# Patient Record
Sex: FEMALE | Race: WHITE | Employment: UNEMPLOYED | RURAL
[De-identification: names, ages, dates, MRNs, and addresses within clinical notes are randomized per-mention and may not be internally consistent; named-entity substitution may affect disease eponyms.]

---

## 2017-03-10 ENCOUNTER — OFFICE VISIT (OUTPATIENT)
Dept: PRIMARY CARE CLINIC | Age: 27
End: 2017-03-10
Payer: MEDICAID

## 2017-03-10 VITALS
TEMPERATURE: 99.1 F | WEIGHT: 137.8 LBS | RESPIRATION RATE: 16 BRPM | BODY MASS INDEX: 23.52 KG/M2 | DIASTOLIC BLOOD PRESSURE: 64 MMHG | OXYGEN SATURATION: 98 % | HEIGHT: 64 IN | HEART RATE: 78 BPM | SYSTOLIC BLOOD PRESSURE: 102 MMHG

## 2017-03-10 DIAGNOSIS — J02.9 THROAT INFECTION: Primary | ICD-10-CM

## 2017-03-10 DIAGNOSIS — J02.9 ACUTE PHARYNGITIS, UNSPECIFIED ETIOLOGY: ICD-10-CM

## 2017-03-10 LAB — S PYO AG THROAT QL: NORMAL

## 2017-03-10 PROCEDURE — 87880 STREP A ASSAY W/OPTIC: CPT | Performed by: NURSE PRACTITIONER

## 2017-03-10 PROCEDURE — 99213 OFFICE O/P EST LOW 20 MIN: CPT | Performed by: NURSE PRACTITIONER

## 2017-03-10 RX ORDER — AZITHROMYCIN 250 MG/1
TABLET, FILM COATED ORAL
Qty: 1 PACKET | Refills: 0 | Status: SHIPPED | OUTPATIENT
Start: 2017-03-10 | End: 2017-03-20

## 2017-03-10 RX ORDER — IBUPROFEN 600 MG/1
600 TABLET ORAL EVERY 6 HOURS PRN
COMMUNITY
End: 2019-07-22 | Stop reason: ALTCHOICE

## 2017-03-10 ASSESSMENT — ENCOUNTER SYMPTOMS
SORE THROAT: 1
RESPIRATORY NEGATIVE: 1
GASTROINTESTINAL NEGATIVE: 1
SINUS PRESSURE: 0
RHINORRHEA: 0

## 2017-11-17 RX ORDER — AZITHROMYCIN 500 MG/1
500 TABLET, FILM COATED ORAL DAILY
Qty: 5 TABLET | Refills: 0 | Status: SHIPPED | OUTPATIENT
Start: 2017-11-17 | End: 2017-11-22

## 2018-11-20 ENCOUNTER — INITIAL PRENATAL (OUTPATIENT)
Dept: OBSTETRICS AND GYNECOLOGY | Facility: CLINIC | Age: 28
End: 2018-11-20

## 2018-11-20 VITALS
WEIGHT: 129 LBS | DIASTOLIC BLOOD PRESSURE: 64 MMHG | BODY MASS INDEX: 22.02 KG/M2 | SYSTOLIC BLOOD PRESSURE: 124 MMHG | HEIGHT: 64 IN

## 2018-11-20 DIAGNOSIS — Z34.81 ENCOUNTER FOR SUPERVISION OF OTHER NORMAL PREGNANCY IN FIRST TRIMESTER: ICD-10-CM

## 2018-11-20 DIAGNOSIS — O36.80X0 ENCOUNTER TO DETERMINE FETAL VIABILITY OF PREGNANCY, SINGLE OR UNSPECIFIED FETUS: Primary | ICD-10-CM

## 2018-11-20 PROBLEM — Z87.442 HISTORY OF KIDNEY STONES: Status: ACTIVE | Noted: 2018-11-20

## 2018-11-20 PROCEDURE — 99204 OFFICE O/P NEW MOD 45 MIN: CPT | Performed by: MIDWIFE

## 2018-11-20 RX ORDER — PROMETHAZINE HYDROCHLORIDE 25 MG/1
25 TABLET ORAL EVERY 6 HOURS PRN
Qty: 20 TABLET | Refills: 0 | Status: SHIPPED | OUTPATIENT
Start: 2018-11-20 | End: 2020-01-09

## 2018-11-20 RX ORDER — PRENATAL VIT/IRON FUM/FOLIC AC 27MG-0.8MG
1 TABLET ORAL DAILY
COMMUNITY
End: 2023-02-10

## 2018-11-20 NOTE — PROGRESS NOTES
"Subjective     Chief Complaint   Patient presents with   • Initial Prenatal Visit     LMP sometime in September, last pap 2018 at Dr Gaona's, c/o diarrhea for the last 2 weeks        Ines Huntley is a 28 y.o. .  Patient's last menstrual period was 2018 (approximate)..  She presents to be seen to initiate prenatal care with our practice. She had a SAB with D+C 2018. # previous pregnacies before that were . She had kidney stones with all of her pregnancies and was referred to see Urologist. She spontaneously passed all stones. She doesn't have a problem with this when not pregnant.    She has had nausea and vomiting for 1-2 weeks. She is having difficulty keeping anything down on some days. She is trying to stay hydrated and eat small meals. She has also had diarrhea x 2 weeks. She started taking vitamin QOD to see if that helped but it hasn't yet.      The following portions of the patient's history were reviewed and updated as appropriate:vital signs, current medications, past medical history, past surgical history and problem list.  Allergies: NKDA  Social history: , nonsmoker    Review of Systems -   /64   Ht 162.6 cm (64\")   Wt 58.5 kg (129 lb)   LMP 2018 (Approximate)   BMI 22.14 kg/m²   Gastrointestinal: Nausea and vomiting, denies constipation   Genitourinary: Frequency - denies urgency or burning with urination  All other systems reviewed and are negative    Objective     Physical Exam  Constitutional   The patient is alert, well developed & well nourished.   Neck   The neck is supple and the trachea is midline. The thyroid is not enlarged and there are no palpable nodules.   Breast   Inspection of the breast reveals symmetrical. The nipples are everted. No masses palpated  Respiratory  The patient is relaxed and breathes without effort. Lungs CTAB  Cardiovascular  Regular rate and rhythm without murmur -  Negative LE pitting edema  Gastrointestinal   The " abdomen is soft and non tender. No hepatosplenomegaly  Genitourinary   - External Genitalia without erythema, lesions, or masses  -Vagina - There is no abnormal vaginal discharge.   -Cervix: Negative cervical motion tenderness   Uterus - uterine body size is approximate to dates, 6-8 week size  Adnexa structures are nontender and without masses  Perineum is without inflammation or lesion  Skin  Normal color. No rashes or lesions  Extremities  Full ROM. No rashes or edema  Psychiatric  The patient is oriented to person, place, and time. Speech is fluent and words are clear    Imaging   No data reviewed        Assessment/Plan     ASSESSMENT  1. IUP at 7w5d   2. Low risk pregnancy  3. First trimester discomforts of pregnancy, N/V/D    PLAN  1. Tests ordered today:  Orders Placed This Encounter   Procedures   • NuSwab VG+ - Swab, Vagina   • US Ob Transvaginal     Standing Status:   Future     Standing Expiration Date:   11/20/2019     Order Specific Question:   Reason for Exam:     Answer:   viability of pregnancy, hx SAB   • OB Panel With HIV     2. Medications prescribed today:  New Medications Ordered This Visit   Medications   • promethazine (PHENERGAN) 25 MG tablet     Sig: Take 1 tablet by mouth Every 6 (Six) Hours As Needed for Nausea or Vomiting.     Dispense:  20 tablet     Refill:  0     3. Information reviewed: exercise in pregnancy, nutrition in pregnancy, weight gain in pregnancy, work and travel restrictions during pregnancy, list of OTC medications acceptable in pregnancy and call coverage groups  4. Genetic testing reviewed: Cystic Fibrosis Screen  5. Ginger products, Vit B6 supp, Unisom, or seabands PRN      Follow up: 4 week(s)         This note was electronically signed.    Mattie Lee CNM  11/20/2018

## 2018-11-22 LAB
A VAGINAE DNA VAG QL NAA+PROBE: NORMAL SCORE
ABO GROUP BLD: (no result)
BASOPHILS # BLD AUTO: 0 X10E3/UL (ref 0–0.2)
BASOPHILS NFR BLD AUTO: 0 %
BLD GP AB SCN SERPL QL: NEGATIVE
BVAB2 DNA VAG QL NAA+PROBE: NORMAL SCORE
C ALBICANS DNA VAG QL NAA+PROBE: NEGATIVE
C GLABRATA DNA VAG QL NAA+PROBE: NEGATIVE
C TRACH RRNA SPEC QL NAA+PROBE: NEGATIVE
EOSINOPHIL # BLD AUTO: 0.1 X10E3/UL (ref 0–0.4)
EOSINOPHIL NFR BLD AUTO: 1 %
ERYTHROCYTE [DISTWIDTH] IN BLOOD BY AUTOMATED COUNT: 13.6 % (ref 12.3–15.4)
HBV SURFACE AG SERPL QL IA: NEGATIVE
HCT VFR BLD AUTO: 40.6 % (ref 34–46.6)
HCV AB S/CO SERPL IA: <0.1 S/CO RATIO (ref 0–0.9)
HGB BLD-MCNC: 13.8 G/DL (ref 11.1–15.9)
HIV 1+2 AB+HIV1 P24 AG SERPL QL IA: NON REACTIVE
IMM GRANULOCYTES # BLD: 0 X10E3/UL (ref 0–0.1)
IMM GRANULOCYTES NFR BLD: 0 %
LYMPHOCYTES # BLD AUTO: 0.9 X10E3/UL (ref 0.7–3.1)
LYMPHOCYTES NFR BLD AUTO: 14 %
MCH RBC QN AUTO: 30.2 PG (ref 26.6–33)
MCHC RBC AUTO-ENTMCNC: 34 G/DL (ref 31.5–35.7)
MCV RBC AUTO: 89 FL (ref 79–97)
MEGA1 DNA VAG QL NAA+PROBE: NORMAL SCORE
MONOCYTES # BLD AUTO: 0.7 X10E3/UL (ref 0.1–0.9)
MONOCYTES NFR BLD AUTO: 11 %
N GONORRHOEA RRNA SPEC QL NAA+PROBE: NEGATIVE
NEUTROPHILS # BLD AUTO: 4.7 X10E3/UL (ref 1.4–7)
NEUTROPHILS NFR BLD AUTO: 74 %
PLATELET # BLD AUTO: 202 X10E3/UL (ref 150–379)
RBC # BLD AUTO: 4.57 X10E6/UL (ref 3.77–5.28)
RH BLD: NEGATIVE
RPR SER QL: NON REACTIVE
RUBV IGG SERPL IA-ACNC: 1.14 INDEX
T VAGINALIS RRNA SPEC QL NAA+PROBE: NEGATIVE
WBC # BLD AUTO: 6.4 X10E3/UL (ref 3.4–10.8)

## 2018-11-29 PROBLEM — Z67.91 RH NEGATIVE STATUS DURING PREGNANCY: Status: ACTIVE | Noted: 2018-11-20

## 2018-11-29 PROBLEM — O26.899 RH NEGATIVE STATUS DURING PREGNANCY: Status: ACTIVE | Noted: 2018-11-20

## 2018-12-03 ENCOUNTER — ROUTINE PRENATAL (OUTPATIENT)
Dept: OBSTETRICS AND GYNECOLOGY | Facility: CLINIC | Age: 28
End: 2018-12-03

## 2018-12-03 VITALS — DIASTOLIC BLOOD PRESSURE: 62 MMHG | BODY MASS INDEX: 22.83 KG/M2 | WEIGHT: 133 LBS | SYSTOLIC BLOOD PRESSURE: 124 MMHG

## 2018-12-03 DIAGNOSIS — Z34.91 NORMAL PREGNANCY, FIRST TRIMESTER: Primary | ICD-10-CM

## 2018-12-03 PROCEDURE — 99213 OFFICE O/P EST LOW 20 MIN: CPT | Performed by: NURSE PRACTITIONER

## 2018-12-03 NOTE — PROGRESS NOTES
[id number]  Chief Complaint   Patient presents with   • Routine Prenatal Visit     scan for dates, c/o dizziness         HPI  , 9w1d reports dizzy spells occasionally -        ROS:  GI: Nausea - No; Constipation - No; Diarrhea - No    Neuro: Headache - No; Visual change - No        EXAM  General Appearance: pleasant  Lungs: Breathing unlabored  Abdomen:  See flow sheet for Fundal ht, FM, FHT's  LE: Neg edema    MDM  Impression:  Problems/Risk: Normal Pregnancy  Umbilical cord cyst  RH negative    Tests done today: none  TVS  (+ umbilical cord cyst) -    Topics discussed: encouraged small frequent healthy meals -   adeq rest and fluids  rev'd NOB labs   encouraged questions - call prn    Tests next visit: none     OB History      Para Term  AB Living    5 3 3   1 3    SAB TAB Ectopic Molar Multiple Live Births    1         3          Past Medical History:   Diagnosis Date   • Rh negative status during pregnancy 2018       Past Surgical History:   Procedure Laterality Date   • DILATATION AND CURETTAGE         History reviewed. No pertinent family history.    Social History     Socioeconomic History   • Marital status:      Spouse name: Not on file   • Number of children: Not on file   • Years of education: Not on file   • Highest education level: Not on file   Social Needs   • Financial resource strain: Not on file   • Food insecurity - worry: Not on file   • Food insecurity - inability: Not on file   • Transportation needs - medical: Not on file   • Transportation needs - non-medical: Not on file   Occupational History   • Not on file   Tobacco Use   • Smoking status: Never Smoker   • Smokeless tobacco: Never Used   Substance and Sexual Activity   • Alcohol use: No     Frequency: Never   • Drug use: No   • Sexual activity: Yes     Partners: Male     Birth control/protection: None   Other Topics Concern   • Not on file   Social History Narrative   • Not on file

## 2018-12-31 ENCOUNTER — APPOINTMENT (OUTPATIENT)
Dept: PREADMISSION TESTING | Facility: HOSPITAL | Age: 28
End: 2018-12-31

## 2018-12-31 ENCOUNTER — ROUTINE PRENATAL (OUTPATIENT)
Dept: OBSTETRICS AND GYNECOLOGY | Facility: CLINIC | Age: 28
End: 2018-12-31

## 2018-12-31 VITALS — DIASTOLIC BLOOD PRESSURE: 68 MMHG | WEIGHT: 137.4 LBS | BODY MASS INDEX: 23.58 KG/M2 | SYSTOLIC BLOOD PRESSURE: 114 MMHG

## 2018-12-31 VITALS — BODY MASS INDEX: 23.39 KG/M2 | HEIGHT: 64 IN | WEIGHT: 137 LBS

## 2018-12-31 DIAGNOSIS — O02.1 MISSED AB: ICD-10-CM

## 2018-12-31 DIAGNOSIS — O36.8390 ULTRASOUND SCAN DONE FOR INABILITY TO HEAR FETAL HEART TONES: ICD-10-CM

## 2018-12-31 DIAGNOSIS — O36.8390 ULTRASOUND SCAN DONE FOR INABILITY TO HEAR FETAL HEART TONES: Primary | ICD-10-CM

## 2018-12-31 LAB
BASOPHILS # BLD AUTO: 0.03 10*3/MM3 (ref 0–0.2)
BASOPHILS NFR BLD AUTO: 0.4 % (ref 0–2.5)
BILIRUB UR QL STRIP: NEGATIVE
CLARITY UR: ABNORMAL
COLOR UR: YELLOW
DEPRECATED RDW RBC AUTO: 41.8 FL (ref 37–54)
EOSINOPHIL # BLD AUTO: 0.03 10*3/MM3 (ref 0–0.7)
EOSINOPHIL NFR BLD AUTO: 0.4 % (ref 0–7)
ERYTHROCYTE [DISTWIDTH] IN BLOOD BY AUTOMATED COUNT: 12.9 % (ref 11.5–14.5)
GLUCOSE UR STRIP-MCNC: NEGATIVE MG/DL
HCT VFR BLD AUTO: 39.8 % (ref 37–47)
HGB BLD-MCNC: 13.6 G/DL (ref 12–16)
HGB UR QL STRIP.AUTO: NEGATIVE
IMM GRANULOCYTES # BLD AUTO: 0.06 10*3/MM3 (ref 0–0.06)
IMM GRANULOCYTES NFR BLD AUTO: 0.8 % (ref 0–0.6)
KETONES UR QL STRIP: NEGATIVE
LEUKOCYTE ESTERASE UR QL STRIP.AUTO: NEGATIVE
LYMPHOCYTES # BLD AUTO: 1.74 10*3/MM3 (ref 0.6–3.4)
LYMPHOCYTES NFR BLD AUTO: 24.2 % (ref 10–50)
MCH RBC QN AUTO: 30.4 PG (ref 27–31)
MCHC RBC AUTO-ENTMCNC: 34.2 G/DL (ref 30–37)
MCV RBC AUTO: 88.8 FL (ref 81–99)
MONOCYTES # BLD AUTO: 0.54 10*3/MM3 (ref 0–0.9)
MONOCYTES NFR BLD AUTO: 7.5 % (ref 0–12)
NEUTROPHILS # BLD AUTO: 4.8 10*3/MM3 (ref 2–6.9)
NEUTROPHILS NFR BLD AUTO: 66.7 % (ref 37–80)
NITRITE UR QL STRIP: NEGATIVE
NRBC BLD AUTO-RTO: 0 /100 WBC (ref 0–0)
PH UR STRIP.AUTO: 7 [PH] (ref 5–8)
PLATELET # BLD AUTO: 207 10*3/MM3 (ref 130–400)
PMV BLD AUTO: 10.6 FL (ref 6–12)
PROT UR QL STRIP: NEGATIVE
RBC # BLD AUTO: 4.48 10*6/MM3 (ref 4.2–5.4)
SP GR UR STRIP: 1.02 (ref 1–1.03)
UROBILINOGEN UR QL STRIP: ABNORMAL
WBC NRBC COR # BLD: 7.2 10*3/MM3 (ref 4.8–10.8)

## 2018-12-31 PROCEDURE — 85025 COMPLETE CBC W/AUTO DIFF WBC: CPT | Performed by: OBSTETRICS & GYNECOLOGY

## 2018-12-31 PROCEDURE — 81003 URINALYSIS AUTO W/O SCOPE: CPT | Performed by: OBSTETRICS & GYNECOLOGY

## 2018-12-31 PROCEDURE — 36415 COLL VENOUS BLD VENIPUNCTURE: CPT

## 2018-12-31 PROCEDURE — 99213 OFFICE O/P EST LOW 20 MIN: CPT | Performed by: OBSTETRICS & GYNECOLOGY

## 2018-12-31 RX ORDER — SODIUM CHLORIDE 0.9 % (FLUSH) 0.9 %
1-10 SYRINGE (ML) INJECTION AS NEEDED
Status: CANCELLED | OUTPATIENT
Start: 2018-12-31

## 2018-12-31 RX ORDER — SODIUM CHLORIDE 0.9 % (FLUSH) 0.9 %
3 SYRINGE (ML) INJECTION EVERY 12 HOURS SCHEDULED
Status: CANCELLED | OUTPATIENT
Start: 2018-12-31

## 2018-12-31 NOTE — PROGRESS NOTES
Chief Complaint   Patient presents with   • Routine Prenatal Visit     C/O of headaches        HPI:   , 13w1d gestation reports doing well    ROS:  See Prenatal Episode/Flowsheet  /68   Wt 62.3 kg (137 lb 6.4 oz)   LMP 2018 (Approximate)   BMI 23.58 kg/m²      EXAM:  EXTREMITIES:  No swelling-See Prenatal Episode/Flowsheet    ABDOMEN:  FHTs/Movement noted-See Prenatal Episode/Flowsheet    URINE GLUCOSE/PROTEIN:  See Prenatal Episode/Flowsheet    PELVIC EXAM:  See Prenatal Episode/Flowsheet  CV:  Lungs:    MDM:    Lab Results   Component Value Date    HGB 13.8 2018    RUBELLAABIGG 1.14 2018    HEPBSAG Negative 2018    ABO O 2018    RH Negative 2018    ABSCRN Negative 2018    NRG4BTC5 Non Reactive 2018    HEPCVIRUSABY <0.1 2018       U/S: CRL 11 weeks, no CA    1. IUP 13w1d  2. Routine care ,   3. MAB @ 11 weeks

## 2019-01-01 NOTE — H&P
Morgan  Ines Hutnley  : 1990  MRN: [id number]  CSN: [id number]    History and Physical    Subjective   Ines Huntley is a 28 y.o. year old  who present for surgery due to MAB @ 11 weeks.    Past Medical History:   Diagnosis Date   • Kidney stones    • Rh negative status during pregnancy 2018   • Wears contact lenses      Past Surgical History:   Procedure Laterality Date   • COLONOSCOPY     • DILATATION AND CURETTAGE       Social History    Tobacco Use      Smoking status: Never Smoker      Smokeless tobacco: Never Used    No current facility-administered medications for this encounter.     Current Outpatient Medications:   •  Prenatal Vit-Fe Fumarate-FA (PRENATAL VITAMIN 27-0.8) 27-0.8 MG tablet tablet, Take 1 tablet by mouth Daily., Disp: , Rfl:   •  promethazine (PHENERGAN) 25 MG tablet, Take 1 tablet by mouth Every 6 (Six) Hours As Needed for Nausea or Vomiting. (Patient not taking: Reported on 2018), Disp: 20 tablet, Rfl: 0    Allergies   Allergen Reactions   • Amoxicillin Hives       Review of Systems   Constitutional: Negative.    HENT: Negative.    Respiratory: Negative.    Cardiovascular: Negative.          Objective   LMP 2018 (Approximate)   General: well developed; well nourished  no acute distress   Heart: regular rate and rhythm, S1, S2 normal, no murmur, click, rub or gallop   Lungs: breathing is unlabored  clear to auscultation bilaterally   Abdomen: soft, non-tender; no masses  no umbilical or inguinal hernias are present  no hepato-splenomegaly   Pelvis:: Not performed.   Labs  Lab Results   Component Value Date     2018    HGB 13.6 2018    HCT 39.8 2018    WBC 7.20 2018        Assessment   1. MAB 11 weeks  2. RH -     Plan   1. Suction D&C   2. Risks, complications, benefits, and other alternatives discussed.    Aleksey Danielle MD  2019  8:47 AM

## 2019-01-02 ENCOUNTER — HOSPITAL ENCOUNTER (OUTPATIENT)
Facility: HOSPITAL | Age: 29
Setting detail: HOSPITAL OUTPATIENT SURGERY
Discharge: HOME | End: 2019-01-02
Attending: OBSTETRICS & GYNECOLOGY | Admitting: OBSTETRICS & GYNECOLOGY

## 2019-01-02 ENCOUNTER — ANESTHESIA (OUTPATIENT)
Dept: PERIOP | Facility: HOSPITAL | Age: 29
End: 2019-01-02

## 2019-01-02 ENCOUNTER — ANESTHESIA EVENT (OUTPATIENT)
Dept: PERIOP | Facility: HOSPITAL | Age: 29
End: 2019-01-02

## 2019-01-02 VITALS
HEART RATE: 75 BPM | DIASTOLIC BLOOD PRESSURE: 64 MMHG | TEMPERATURE: 97.9 F | RESPIRATION RATE: 16 BRPM | SYSTOLIC BLOOD PRESSURE: 105 MMHG | OXYGEN SATURATION: 100 %

## 2019-01-02 DIAGNOSIS — O02.1 MISSED AB: ICD-10-CM

## 2019-01-02 DIAGNOSIS — O36.8390 ULTRASOUND SCAN DONE FOR INABILITY TO HEAR FETAL HEART TONES: ICD-10-CM

## 2019-01-02 PROCEDURE — 25010000002 MIDAZOLAM PER 1 MG: Performed by: NURSE ANESTHETIST, CERTIFIED REGISTERED

## 2019-01-02 PROCEDURE — 25010000002 METHYLERGONOVINE MALEATE PER 0.2 MG: Performed by: NURSE ANESTHETIST, CERTIFIED REGISTERED

## 2019-01-02 PROCEDURE — 25010000002 PROPOFOL 10 MG/ML EMULSION: Performed by: NURSE ANESTHETIST, CERTIFIED REGISTERED

## 2019-01-02 PROCEDURE — 59820 CARE OF MISCARRIAGE: CPT | Performed by: OBSTETRICS & GYNECOLOGY

## 2019-01-02 PROCEDURE — 25010000002 KETOROLAC TROMETHAMINE PER 15 MG: Performed by: NURSE ANESTHETIST, CERTIFIED REGISTERED

## 2019-01-02 PROCEDURE — 25010000002 RHO D IMMUNE GLOBULIN 1500 UNIT/2ML SOLUTION PREFILLED SYRINGE: Performed by: OBSTETRICS & GYNECOLOGY

## 2019-01-02 PROCEDURE — 25010000002 FENTANYL CITRATE (PF) 100 MCG/2ML SOLUTION: Performed by: NURSE ANESTHETIST, CERTIFIED REGISTERED

## 2019-01-02 RX ORDER — HYDROCODONE BITARTRATE AND ACETAMINOPHEN 5; 325 MG/1; MG/1
1 TABLET ORAL ONCE AS NEEDED
Status: CANCELLED | OUTPATIENT
Start: 2019-01-02 | End: 2019-01-12

## 2019-01-02 RX ORDER — METHYLERGONOVINE MALEATE 0.2 MG/1
0.2 TABLET ORAL EVERY 6 HOURS
Qty: 4 TABLET | Refills: 0 | Status: SHIPPED | OUTPATIENT
Start: 2019-01-02 | End: 2020-01-09

## 2019-01-02 RX ORDER — METHYLERGONOVINE MALEATE 0.2 MG/ML
INJECTION INTRAVENOUS AS NEEDED
Status: DISCONTINUED | OUTPATIENT
Start: 2019-01-02 | End: 2019-01-02 | Stop reason: SURG

## 2019-01-02 RX ORDER — IBUPROFEN 600 MG/1
600 TABLET ORAL EVERY 6 HOURS PRN
Status: CANCELLED | OUTPATIENT
Start: 2019-01-02

## 2019-01-02 RX ORDER — AZITHROMYCIN 250 MG/1
TABLET, FILM COATED ORAL
Qty: 6 TABLET | Refills: 0 | Status: SHIPPED | OUTPATIENT
Start: 2019-01-02 | End: 2019-01-07

## 2019-01-02 RX ORDER — KETOROLAC TROMETHAMINE 30 MG/ML
INJECTION, SOLUTION INTRAMUSCULAR; INTRAVENOUS AS NEEDED
Status: DISCONTINUED | OUTPATIENT
Start: 2019-01-02 | End: 2019-01-02 | Stop reason: SURG

## 2019-01-02 RX ORDER — MAGNESIUM HYDROXIDE 1200 MG/15ML
LIQUID ORAL AS NEEDED
Status: DISCONTINUED | OUTPATIENT
Start: 2019-01-02 | End: 2019-01-02 | Stop reason: HOSPADM

## 2019-01-02 RX ORDER — ONDANSETRON 4 MG/1
4 TABLET, FILM COATED ORAL ONCE AS NEEDED
Status: DISCONTINUED | OUTPATIENT
Start: 2019-01-02 | End: 2019-01-02 | Stop reason: HOSPADM

## 2019-01-02 RX ORDER — PROPOFOL 10 MG/ML
VIAL (ML) INTRAVENOUS AS NEEDED
Status: DISCONTINUED | OUTPATIENT
Start: 2019-01-02 | End: 2019-01-02 | Stop reason: SURG

## 2019-01-02 RX ORDER — IBUPROFEN 600 MG/1
600 TABLET ORAL EVERY 6 HOURS PRN
Qty: 30 TABLET | Refills: 1 | Status: SHIPPED | OUTPATIENT
Start: 2019-01-02 | End: 2020-01-09

## 2019-01-02 RX ORDER — SODIUM CHLORIDE, SODIUM LACTATE, POTASSIUM CHLORIDE, CALCIUM CHLORIDE 600; 310; 30; 20 MG/100ML; MG/100ML; MG/100ML; MG/100ML
1000 INJECTION, SOLUTION INTRAVENOUS CONTINUOUS
Status: DISCONTINUED | OUTPATIENT
Start: 2019-01-02 | End: 2019-01-02 | Stop reason: HOSPADM

## 2019-01-02 RX ORDER — HYDROCODONE BITARTRATE AND ACETAMINOPHEN 5; 325 MG/1; MG/1
1 TABLET ORAL EVERY 6 HOURS PRN
Qty: 6 TABLET | Refills: 0 | Status: SHIPPED | OUTPATIENT
Start: 2019-01-02 | End: 2020-01-09

## 2019-01-02 RX ORDER — FENTANYL CITRATE 50 UG/ML
INJECTION, SOLUTION INTRAMUSCULAR; INTRAVENOUS AS NEEDED
Status: DISCONTINUED | OUTPATIENT
Start: 2019-01-02 | End: 2019-01-02 | Stop reason: SURG

## 2019-01-02 RX ORDER — LIDOCAINE HYDROCHLORIDE 10 MG/ML
INJECTION, SOLUTION EPIDURAL; INFILTRATION; INTRACAUDAL; PERINEURAL AS NEEDED
Status: DISCONTINUED | OUTPATIENT
Start: 2019-01-02 | End: 2019-01-02 | Stop reason: HOSPADM

## 2019-01-02 RX ORDER — MIDAZOLAM HYDROCHLORIDE 1 MG/ML
INJECTION INTRAMUSCULAR; INTRAVENOUS AS NEEDED
Status: DISCONTINUED | OUTPATIENT
Start: 2019-01-02 | End: 2019-01-02 | Stop reason: SURG

## 2019-01-02 RX ADMIN — METHYLERGONOVINE MALEATE 200 MCG: 0.2 INJECTION, SOLUTION INTRAMUSCULAR; INTRAVENOUS at 09:36

## 2019-01-02 RX ADMIN — PROPOFOL 50 MG: 10 INJECTION, EMULSION INTRAVENOUS at 09:21

## 2019-01-02 RX ADMIN — HUMAN RHO(D) IMMUNE GLOBULIN 1500 UNITS: 1500 SOLUTION INTRAMUSCULAR; INTRAVENOUS at 10:20

## 2019-01-02 RX ADMIN — SODIUM CHLORIDE, POTASSIUM CHLORIDE, SODIUM LACTATE AND CALCIUM CHLORIDE 1000 ML: 600; 310; 30; 20 INJECTION, SOLUTION INTRAVENOUS at 07:40

## 2019-01-02 RX ADMIN — MIDAZOLAM HYDROCHLORIDE 2 MG: 1 INJECTION, SOLUTION INTRAMUSCULAR; INTRAVENOUS at 09:14

## 2019-01-02 RX ADMIN — PROPOFOL 50 MG: 10 INJECTION, EMULSION INTRAVENOUS at 09:16

## 2019-01-02 RX ADMIN — FENTANYL CITRATE 100 MCG: 50 INJECTION, SOLUTION INTRAMUSCULAR; INTRAVENOUS at 09:14

## 2019-01-02 RX ADMIN — PROPOFOL 50 MG: 10 INJECTION, EMULSION INTRAVENOUS at 09:35

## 2019-01-02 RX ADMIN — KETOROLAC TROMETHAMINE 30 MG: 30 INJECTION, SOLUTION INTRAMUSCULAR at 09:36

## 2019-01-02 RX ADMIN — PROPOFOL 50 MG: 10 INJECTION, EMULSION INTRAVENOUS at 09:30

## 2019-01-02 RX ADMIN — LIDOCAINE HYDROCHLORIDE 80 MG: 20 INJECTION, SOLUTION INTRAVENOUS at 09:16

## 2019-01-02 RX ADMIN — PROPOFOL 50 MG: 10 INJECTION, EMULSION INTRAVENOUS at 09:26

## 2019-01-02 NOTE — ANESTHESIA PREPROCEDURE EVALUATION
Anesthesia Evaluation     Patient summary reviewed and Nursing notes reviewed   NPO Solid Status: > 8 hours  NPO Liquid Status: > 8 hours           Airway   Mallampati: II  TM distance: >3 FB  Neck ROM: full  No difficulty expected  Dental      Pulmonary    Cardiovascular   Exercise tolerance: good (4-7 METS)    Rhythm: regular  Rate: normal        Neuro/Psych  GI/Hepatic/Renal/Endo      Musculoskeletal     Abdominal    Substance History      OB/GYN    (-)  Pregnant        Other                        Anesthesia Plan    ASA 2     MAC     intravenous induction   Anesthetic plan, all risks, benefits, and alternatives have been provided, discussed and informed consent has been obtained with: patient.

## 2019-01-02 NOTE — OP NOTE
Eddie Huntley  : 1990  MRN: [id number]  CSN: [id number]  Date: 2019    Operative Report    DILATATION AND CURETTAGE WITH SUCTION      Pre-op Diagnosis:  Ultrasound scan done for inability to hear fetal heart tones [O76]  Missed ab [O02.1]   Post-op Diagnosis:  Post-Op Diagnosis Codes:     * Ultrasound scan done for inability to hear fetal heart tones [O76]     * Missed ab [O02.1]   Procedure: Procedure(s):  DILATATION AND CURETTAGE WITH SUCTION   Surgeon: Surgeon(s):  Aleksey Danielle MD   Assist: DESEAN Velasco   Anesthesia: Monitor Anesthesia Care   Estimated Blood Loss: 50 mls   ABx: none   Specimens:  POC's   Findings: POC's   Complications: none     Description of Procedure:  After the appropriate time out and after adequate dosing of her anesthesia, the patient had been prepped and draped in the usual sterile fashion.  The patient had been placed in the dorsal lithotomy position using Jaylan stirrups.  The bladder had been drained with a red rubber catheter per nursing.  A weighted speculum was placed in the vagina.  The anterior lip of the cervix was grasped with a single tooth tenaculum.  The cervix was injected at the 3 and 9 o'clock positions with 1% lidocaine without any complications.  The cervix was then progressively dilated with Javed dilators. #10 Suction curettage was then performed followed by sharp curettage with a good cry throughout, followed by further Suction.  The cervical tenaculum was removed and the cervix was noted to be hemostatic.  The patient tolerated the procedure well.  Patient received 0.2 mg of Methergine intramuscularly at the completion.  There were no complications.  All instrument and sponge counts were correct at the end of the procedure.  She was taken to postoperative recovery room in stable condition.      Aleksey Danielle MD   2019  9:42 AM

## 2019-01-02 NOTE — ANESTHESIA POSTPROCEDURE EVALUATION
Patient: Ines Huntley    Procedure Summary     Date:  01/02/19 Room / Location:  [facility] 2 / [facility]    Anesthesia Start:  0911 Anesthesia Stop:      Procedure:  DILATATION AND CURETTAGE WITH SUCTION (N/A Vagina) Diagnosis:       Ultrasound scan done for inability to hear fetal heart tones      Missed ab      (Ultrasound scan done for inability to hear fetal heart tones [O76])      (Missed ab [O02.1])    Surgeon:  Aleksey Danielle MD Provider:  Jasbir Lee CRNA    Anesthesia Type:  MAC ASA Status:  2          Anesthesia Type: MAC  Last vitals  BP   100/61   Temp   97   Pulse 81   Resp   17    SpO2   98     Post Anesthesia Care and Evaluation    Patient location during evaluation: PACU  Patient participation: complete - patient participated  Level of consciousness: awake and alert  Pain score: 0  Pain management: satisfactory to patient  Airway patency: patent  Anesthetic complications: No anesthetic complications  PONV Status: none  Cardiovascular status: acceptable and stable  Respiratory status: acceptable and nasal cannula  Hydration status: acceptable

## 2019-01-02 NOTE — DISCHARGE INSTRUCTIONS
Keep the affected extremity elevated above  level of the heart.  Use your ice pack as instructed, do not use continuously.  Use your crutches and or sling as directed  Follow your physicians instructions as previously directed.    No pushing, pulling, tugging,  heavy lifting, or strenuous activity.  No major decision making, driving, or drinking alcoholic beverages for 24 hours. ( due to the medications you have  received)  Always use good hand hygiene/washing techniques.  NO driving while taking pain medications.    To assist you in voiding:  Drink plenty of fluids  Listen to running water while attempting to void.    If you are unable to urinate and you have an uncomfortable urge to void or it has been   6 hours since you were discharged, return to the Emergency Room

## 2019-01-07 ENCOUNTER — OFFICE VISIT (OUTPATIENT)
Dept: OBSTETRICS AND GYNECOLOGY | Facility: CLINIC | Age: 29
End: 2019-01-07

## 2019-01-07 VITALS
BODY MASS INDEX: 22.2 KG/M2 | SYSTOLIC BLOOD PRESSURE: 112 MMHG | DIASTOLIC BLOOD PRESSURE: 62 MMHG | WEIGHT: 130 LBS | HEIGHT: 64 IN

## 2019-01-07 PROCEDURE — 99213 OFFICE O/P EST LOW 20 MIN: CPT | Performed by: OBSTETRICS & GYNECOLOGY

## 2019-01-07 NOTE — PROGRESS NOTES
Subjective   Chief Complaint   Patient presents with   • Post-op Follow-up     Pt c/o of depression,anxiety, since miscarriage last week,     Ines Huntley is a 28 y.o. year old .  Patient's last menstrual period was 2018 (approximate).  She presents to be seen because of blues following suction D&C last week, doing well fro ma post op [facility] otherwise. Denies Si HI. Home with youngest throughout the day nad [facility] [place] door.     OTHER COMPLAINTS:  Nothing else    The following portions of the patient's history were reviewed and updated as appropriate:  She  has a past medical history of Kidney stones, Rh negative status during pregnancy (2018), and Wears contact lenses.  She does not have any pertinent problems on file.  She  has a past surgical history that includes Dilation and curettage of uterus; Colonoscopy; and DILATATION AND CURETTAGE WITH SUCTION (N/A, 2019).  Her family history is not on file.  She  reports that  has never smoked. she has never used smokeless tobacco. She reports that she does not drink alcohol or use drugs.    Current Outpatient Medications on File Prior to Visit   Medication Sig   • azithromycin (ZITHROMAX Z-BERT) 250 MG tablet Take 2 tablets by mouth on Day 1,  followed by 1 tablet once daily on Days 2 through 5.   • HYDROcodone-acetaminophen (LORTAB) 5-325 MG per tablet Take 1 tablet by mouth Every 6 (Six) Hours As Needed for Severe Pain .   • ibuprofen (ADVIL,MOTRIN) 600 MG tablet Take 1 tablet by mouth Every 6 (Six) Hours As Needed for Moderate Pain .   • methylergonovine (METHERGINE) 0.2 MG tablet Take 1 tablet by mouth Every 6 (Six) Hours.   • Prenatal Vit-Fe Fumarate-FA (PRENATAL VITAMIN 27-0.8) 27-0.8 MG tablet tablet Take 1 tablet by mouth Daily.   • promethazine (PHENERGAN) 25 MG tablet Take 1 tablet by mouth Every 6 (Six) Hours As Needed for Nausea or Vomiting. (Patient not taking: Reported on 2018)     No current facility-administered  "medications on file prior to visit.      She is allergic to amoxicillin.    Social History    Tobacco Use      Smoking status: Never Smoker      Smokeless tobacco: Never Used    Review of Systems  Consitutional POS: nothing reported    NEG: anorexia or night sweats   Gastointestinal POS: nothing reported    NEG: bloating, change in bowel habits, melena or reflux symptoms   Genitourinary POS: nothing reported    NEG: dysuria or hematuria   Integument POS: nothing reported    NEG: moles that are changing in size, shape, color or rashes   Breast POS: nothing reported    NEG: persistent breast lump, skin dimpling or nipple discharge         Respiratory: negative  Cardiovascular: negative          Objective   /62   Ht 162.6 cm (64\")   Wt 59 kg (130 lb)   LMP 09/27/2018 (Approximate)   Breastfeeding? Yes   BMI 22.31 kg/m²     General:  well developed; well nourished  no acute distress   Skin:  Not performed.   Thyroid: not examined   Lungs:  breathing is unlabored   Heart:  Not performed.   Breasts:  Not performed.   Abdomen: soft, non-tender; no masses  no umbilical or inguinal hernias are present  no hepato-splenomegaly   Pelvis: Not performed.     Psychiatric: Alert and oriented ×3, mood and affect appropriate  HEENT: Atraumatic, normocephalic, normal scleral icterus  Extremities: 2+ pulses bilaterally, no edema      Lab Review   No data reviewed    Imaging   No data reviewed        Assessment   1. Depression s/p suction D&C, post partum hisotry of this this as well as after last D&C, tried on prozac and effexor last time and did not feels as though they worked     Plan   1. Start Zoloft 50mg po qam, f/u 4 weeks  2.     No orders of the defined types were placed in this encounter.         This note was electronically signed.      January 7, 2019      "

## 2019-01-18 LAB
LAB AP CASE REPORT: NORMAL
PATH REPORT.FINAL DX SPEC: NORMAL

## 2019-07-22 ENCOUNTER — OFFICE VISIT (OUTPATIENT)
Dept: PRIMARY CARE CLINIC | Age: 29
End: 2019-07-22

## 2019-07-22 VITALS
TEMPERATURE: 98.4 F | BODY MASS INDEX: 22.36 KG/M2 | SYSTOLIC BLOOD PRESSURE: 103 MMHG | HEART RATE: 77 BPM | WEIGHT: 131 LBS | HEIGHT: 64 IN | OXYGEN SATURATION: 99 % | DIASTOLIC BLOOD PRESSURE: 67 MMHG

## 2019-07-22 DIAGNOSIS — F32.A ANXIETY AND DEPRESSION: Primary | ICD-10-CM

## 2019-07-22 DIAGNOSIS — F41.9 ANXIETY AND DEPRESSION: Primary | ICD-10-CM

## 2019-07-22 PROCEDURE — 99213 OFFICE O/P EST LOW 20 MIN: CPT | Performed by: NURSE PRACTITIONER

## 2019-07-22 PROCEDURE — G0444 DEPRESSION SCREEN ANNUAL: HCPCS | Performed by: NURSE PRACTITIONER

## 2019-07-22 ASSESSMENT — PATIENT HEALTH QUESTIONNAIRE - PHQ9
SUM OF ALL RESPONSES TO PHQ QUESTIONS 1-9: 7
1. LITTLE INTEREST OR PLEASURE IN DOING THINGS: 1
SUM OF ALL RESPONSES TO PHQ9 QUESTIONS 1 & 2: 4
10. IF YOU CHECKED OFF ANY PROBLEMS, HOW DIFFICULT HAVE THESE PROBLEMS MADE IT FOR YOU TO DO YOUR WORK, TAKE CARE OF THINGS AT HOME, OR GET ALONG WITH OTHER PEOPLE: 0
SUM OF ALL RESPONSES TO PHQ QUESTIONS 1-9: 7
2. FEELING DOWN, DEPRESSED OR HOPELESS: 3
9. THOUGHTS THAT YOU WOULD BE BETTER OFF DEAD, OR OF HURTING YOURSELF: 0
7. TROUBLE CONCENTRATING ON THINGS, SUCH AS READING THE NEWSPAPER OR WATCHING TELEVISION: 0
5. POOR APPETITE OR OVEREATING: 0
6. FEELING BAD ABOUT YOURSELF - OR THAT YOU ARE A FAILURE OR HAVE LET YOURSELF OR YOUR FAMILY DOWN: 0
4. FEELING TIRED OR HAVING LITTLE ENERGY: 0
8. MOVING OR SPEAKING SO SLOWLY THAT OTHER PEOPLE COULD HAVE NOTICED. OR THE OPPOSITE, BEING SO FIGETY OR RESTLESS THAT YOU HAVE BEEN MOVING AROUND A LOT MORE THAN USUAL: 0
3. TROUBLE FALLING OR STAYING ASLEEP: 3

## 2019-07-30 ENCOUNTER — OFFICE VISIT (OUTPATIENT)
Dept: PRIMARY CARE CLINIC | Age: 29
End: 2019-07-30

## 2019-07-30 VITALS
WEIGHT: 130 LBS | HEART RATE: 74 BPM | BODY MASS INDEX: 22.2 KG/M2 | SYSTOLIC BLOOD PRESSURE: 110 MMHG | DIASTOLIC BLOOD PRESSURE: 70 MMHG | RESPIRATION RATE: 16 BRPM | HEIGHT: 64 IN | OXYGEN SATURATION: 99 %

## 2019-07-30 DIAGNOSIS — J02.9 SORE THROAT: Primary | ICD-10-CM

## 2019-07-30 DIAGNOSIS — J02.8 PHARYNGITIS DUE TO OTHER ORGANISM: ICD-10-CM

## 2019-07-30 LAB — S PYO AG THROAT QL: NORMAL

## 2019-07-30 PROCEDURE — 87880 STREP A ASSAY W/OPTIC: CPT | Performed by: PEDIATRICS

## 2019-07-30 PROCEDURE — 99202 OFFICE O/P NEW SF 15 MIN: CPT | Performed by: PEDIATRICS

## 2019-07-30 RX ORDER — AZITHROMYCIN 250 MG/1
TABLET, FILM COATED ORAL
Qty: 6 TABLET | Refills: 0 | Status: SHIPPED | OUTPATIENT
Start: 2019-07-30 | End: 2020-03-18 | Stop reason: ALTCHOICE

## 2019-07-30 ASSESSMENT — ENCOUNTER SYMPTOMS
COUGH: 0
BACK PAIN: 0
WHEEZING: 0
SORE THROAT: 1
COUGH: 0
EYE DISCHARGE: 0
ABDOMINAL PAIN: 0
ABDOMINAL PAIN: 0
SHORTNESS OF BREATH: 0
SINUS PRESSURE: 0
NAUSEA: 0
ABDOMINAL DISTENTION: 0
VOMITING: 0
VOMITING: 0
SHORTNESS OF BREATH: 0
SINUS PAIN: 0
BACK PAIN: 0
NAUSEA: 0
CHEST TIGHTNESS: 0

## 2019-07-30 NOTE — PATIENT INSTRUCTIONS
· Keep a list of your medicines with you. List all of the prescription medicines, nonprescription medicines, supplements, natural remedies, and vitamins that you take. Tell your healthcare providers who treat you about all of the products you are taking. Your provider can provide you with a form to keep track of them. Just ask. · Follow the directions that come with your medicine, including information about food or alcohol. Make sure you know how and when to take your medicine. Do not take more or less than you are supposed to take. · Keep all medicines out of the reach of children. · Store medicines according to the directions on the label. · Monitor yourself. Learn to know how your body reacts to your new medicine and keep track of how it makes you feel before attempting (If your provider has allowed you to do so) to drive or go to work. · Seek emergency medical attention if you think you have used too much of this medicine. An overdose of any prescription medicine can be fatal. Overdose symptoms may include extreme drowsiness, muscle weakness, confusion, cold and clammy skin, pinpoint pupils, shallow breathing, slow heart rate, fainting, or coma. · Don't share prescription medicines with others, even when they seem to have the same symptoms. What may be good for you may be harmful to others. · If you are no longer taking a prescribed medication and you have pills left please take your pills out of their original containers. Mix crushed pills with an undesirable substance, such as cat litter or used coffee grounds. Put the mixture into a disposable container with a lid, such as an empty margarine tub, or into a sealable bag. Cover up or remove any of your personal information on the empty containers by covering it with black permanent marker or duct tape. Place the sealed container with the mixture, and the empty drug containers, in the trash.    · If you use a medication that is in the form of a patch, dispose of used patches by folding them in half so that the sticky sides meet, and then flushing them down a toilet. They should not be placed in the household trash where children or pets can find them. · If you have any questions, ask your provider or pharmacist for more information. · Be sure to keep all appointments for provider visits or tests. We are committed to providing you with the best care possible. In order to help us achieve these goals please remember to bring all medications, herbal products, and over the counter supplements with you to each visit. If your provider has ordered testing for you, please be sure to follow up with our office if you have not received results within 7 days after the testing took place. *If you receive a survey after visiting one of our offices, please take time to share your experience concerning your physician office visit. These surveys are confidential and no health information about you is shared. We are eager to improve for you and we are counting on your feedback to help make that happen. Thank you for requesting your Continuity of Care Document (CCD) electronically. Please follow the instructions below to securely access your online medical record. [employer] allows you to send messages to your doctor, view your test results, renew your prescriptions, schedule appointments, and more. How Do I Access my CCD? In your Internet browser, go to [email or web address] Enter your user name and password   Click on My medical Record  --> Download Summary --> Enter Password --> Download --> Save or Open Document    Additional Information  If you have questions, please contact your physician practice where you receive care. Remember, [employer] is NOT to be used for urgent needs. For medical emergencies, dial 911.

## 2019-12-23 ENCOUNTER — APPOINTMENT (OUTPATIENT)
Dept: ULTRASOUND IMAGING | Facility: HOSPITAL | Age: 29
End: 2019-12-23

## 2019-12-23 ENCOUNTER — HOSPITAL ENCOUNTER (EMERGENCY)
Facility: HOSPITAL | Age: 29
Discharge: HOME | End: 2019-12-23
Attending: EMERGENCY MEDICINE | Admitting: EMERGENCY MEDICINE

## 2019-12-23 VITALS
HEART RATE: 92 BPM | OXYGEN SATURATION: 100 % | SYSTOLIC BLOOD PRESSURE: 100 MMHG | RESPIRATION RATE: 18 BRPM | WEIGHT: 138.4 LBS | BODY MASS INDEX: 23.63 KG/M2 | DIASTOLIC BLOOD PRESSURE: 63 MMHG | HEIGHT: 64 IN | TEMPERATURE: 98.8 F

## 2019-12-23 DIAGNOSIS — R10.9 ABDOMINAL PAIN DURING PREGNANCY IN FIRST TRIMESTER: Primary | ICD-10-CM

## 2019-12-23 DIAGNOSIS — O26.891 ABDOMINAL PAIN DURING PREGNANCY IN FIRST TRIMESTER: Primary | ICD-10-CM

## 2019-12-23 LAB
ALBUMIN SERPL-MCNC: 4.1 G/DL (ref 3.5–5.2)
ALBUMIN/GLOB SERPL: 1.9 G/DL
ALP SERPL-CCNC: 53 U/L (ref 39–117)
ALT SERPL W P-5'-P-CCNC: 10 U/L (ref 1–33)
ANION GAP SERPL CALCULATED.3IONS-SCNC: 14.2 MMOL/L (ref 5–15)
AST SERPL-CCNC: 15 U/L (ref 1–32)
B-HCG UR QL: POSITIVE
BACTERIA UR QL AUTO: ABNORMAL /HPF
BASOPHILS # BLD AUTO: 0.05 10*3/MM3 (ref 0–0.2)
BASOPHILS NFR BLD AUTO: 0.6 % (ref 0–1.5)
BILIRUB SERPL-MCNC: 0.5 MG/DL (ref 0.2–1.2)
BILIRUB UR QL STRIP: NEGATIVE
BUN BLD-MCNC: 8 MG/DL (ref 6–20)
BUN/CREAT SERPL: 11.8 (ref 7–25)
CALCIUM SPEC-SCNC: 8.3 MG/DL (ref 8.6–10.5)
CHLORIDE SERPL-SCNC: 105 MMOL/L (ref 98–107)
CLARITY UR: CLEAR
CO2 SERPL-SCNC: 20.8 MMOL/L (ref 22–29)
COLOR UR: YELLOW
CREAT BLD-MCNC: 0.68 MG/DL (ref 0.57–1)
DEPRECATED RDW RBC AUTO: 41.8 FL (ref 37–54)
EOSINOPHIL # BLD AUTO: 0.04 10*3/MM3 (ref 0–0.4)
EOSINOPHIL NFR BLD AUTO: 0.5 % (ref 0.3–6.2)
ERYTHROCYTE [DISTWIDTH] IN BLOOD BY AUTOMATED COUNT: 12.7 % (ref 12.3–15.4)
GFR SERPL CREATININE-BSD FRML MDRD: 101 ML/MIN/1.73
GLOBULIN UR ELPH-MCNC: 2.2 GM/DL
GLUCOSE BLD-MCNC: 87 MG/DL (ref 65–99)
GLUCOSE UR STRIP-MCNC: NEGATIVE MG/DL
HCG INTACT+B SERPL-ACNC: NORMAL MIU/ML
HCT VFR BLD AUTO: 39.6 % (ref 34–46.6)
HGB BLD-MCNC: 13.5 G/DL (ref 12–15.9)
HGB UR QL STRIP.AUTO: NEGATIVE
HYALINE CASTS UR QL AUTO: ABNORMAL /LPF
IMM GRANULOCYTES # BLD AUTO: 0.06 10*3/MM3 (ref 0–0.05)
IMM GRANULOCYTES NFR BLD AUTO: 0.7 % (ref 0–0.5)
KETONES UR QL STRIP: NEGATIVE
LEUKOCYTE ESTERASE UR QL STRIP.AUTO: ABNORMAL
LYMPHOCYTES # BLD AUTO: 1.82 10*3/MM3 (ref 0.7–3.1)
LYMPHOCYTES NFR BLD AUTO: 21.7 % (ref 19.6–45.3)
MCH RBC QN AUTO: 30.9 PG (ref 26.6–33)
MCHC RBC AUTO-ENTMCNC: 34.1 G/DL (ref 31.5–35.7)
MCV RBC AUTO: 90.6 FL (ref 79–97)
MONOCYTES # BLD AUTO: 0.67 10*3/MM3 (ref 0.1–0.9)
MONOCYTES NFR BLD AUTO: 8 % (ref 5–12)
NEUTROPHILS # BLD AUTO: 5.76 10*3/MM3 (ref 1.7–7)
NEUTROPHILS NFR BLD AUTO: 68.5 % (ref 42.7–76)
NITRITE UR QL STRIP: NEGATIVE
NRBC BLD AUTO-RTO: 0 /100 WBC (ref 0–0.2)
PH UR STRIP.AUTO: 7 [PH] (ref 5–8)
PLATELET # BLD AUTO: 198 10*3/MM3 (ref 140–450)
PMV BLD AUTO: 10.4 FL (ref 6–12)
POTASSIUM BLD-SCNC: 3.9 MMOL/L (ref 3.5–5.2)
PROT SERPL-MCNC: 6.3 G/DL (ref 6–8.5)
PROT UR QL STRIP: NEGATIVE
RBC # BLD AUTO: 4.37 10*6/MM3 (ref 3.77–5.28)
RBC # UR: ABNORMAL /HPF
REF LAB TEST METHOD: ABNORMAL
SODIUM BLD-SCNC: 140 MMOL/L (ref 136–145)
SP GR UR STRIP: 1.01 (ref 1–1.03)
SQUAMOUS #/AREA URNS HPF: ABNORMAL /HPF
UROBILINOGEN UR QL STRIP: ABNORMAL
WBC NRBC COR # BLD: 8.4 10*3/MM3 (ref 3.4–10.8)
WBC UR QL AUTO: ABNORMAL /HPF

## 2019-12-23 PROCEDURE — 99283 EMERGENCY DEPT VISIT LOW MDM: CPT

## 2019-12-23 PROCEDURE — 80053 COMPREHEN METABOLIC PANEL: CPT | Performed by: EMERGENCY MEDICINE

## 2019-12-23 PROCEDURE — 96360 HYDRATION IV INFUSION INIT: CPT

## 2019-12-23 PROCEDURE — 84702 CHORIONIC GONADOTROPIN TEST: CPT | Performed by: EMERGENCY MEDICINE

## 2019-12-23 PROCEDURE — 85025 COMPLETE CBC W/AUTO DIFF WBC: CPT | Performed by: EMERGENCY MEDICINE

## 2019-12-23 PROCEDURE — 99284 EMERGENCY DEPT VISIT MOD MDM: CPT

## 2019-12-23 PROCEDURE — 81001 URINALYSIS AUTO W/SCOPE: CPT | Performed by: EMERGENCY MEDICINE

## 2019-12-23 PROCEDURE — 76817 TRANSVAGINAL US OBSTETRIC: CPT

## 2019-12-23 PROCEDURE — 81025 URINE PREGNANCY TEST: CPT | Performed by: EMERGENCY MEDICINE

## 2019-12-23 RX ORDER — SODIUM CHLORIDE 0.9 % (FLUSH) 0.9 %
10 SYRINGE (ML) INJECTION AS NEEDED
Status: DISCONTINUED | OUTPATIENT
Start: 2019-12-23 | End: 2019-12-23 | Stop reason: HOSPADM

## 2019-12-23 RX ADMIN — SODIUM CHLORIDE 1000 ML: 9 INJECTION, SOLUTION INTRAVENOUS at 13:36

## 2020-01-09 ENCOUNTER — INITIAL PRENATAL (OUTPATIENT)
Dept: OBSTETRICS AND GYNECOLOGY | Facility: CLINIC | Age: 30
End: 2020-01-09

## 2020-01-09 VITALS — SYSTOLIC BLOOD PRESSURE: 124 MMHG | BODY MASS INDEX: 24.03 KG/M2 | WEIGHT: 140 LBS | DIASTOLIC BLOOD PRESSURE: 68 MMHG

## 2020-01-09 DIAGNOSIS — Z34.81 ENCOUNTER FOR SUPERVISION OF OTHER NORMAL PREGNANCY IN FIRST TRIMESTER: Primary | ICD-10-CM

## 2020-01-09 DIAGNOSIS — O36.80X0 ENCOUNTER TO DETERMINE FETAL VIABILITY OF PREGNANCY, SINGLE OR UNSPECIFIED FETUS: ICD-10-CM

## 2020-01-09 LAB
C TRACH RRNA SPEC DONR QL NAA+PROBE: NEGATIVE
N GONORRHOEA DNA SPEC QL NAA+PROBE: NEGATIVE

## 2020-01-09 PROCEDURE — 99213 OFFICE O/P EST LOW 20 MIN: CPT | Performed by: MIDWIFE

## 2020-01-09 NOTE — PROGRESS NOTES
Subjective     Chief Complaint   Patient presents with   • Initial Prenatal Visit     LMP 11/10/19, seen in ED on 19 for cramping, had TVS in ED with IUP measuring 6w1d, pap due, states still having cramping but no vaginal bleeding        Ines Huntley is a 29 y.o. .  Patient's last menstrual period was 11/10/2019 (approximate)..  She presents to be seen to initiate prenatal care with our practice. Went to ED on  and had a TVS. She continues to have cramping and some spotting. Her pregnancy symptoms have all gone away. She is concerned due to her history of SABs.    Past Medical History:   Diagnosis Date   • Kidney stones    • Rh negative status during pregnancy 2018   • Wears contact lenses      Allergies   Allergen Reactions   • Amoxicillin Hives     Social History     Socioeconomic History   • Marital status:      Spouse name: Not on file   • Number of children: Not on file   • Years of education: Not on file   • Highest education level: Not on file   Tobacco Use   • Smoking status: Never Smoker   • Smokeless tobacco: Never Used   Substance and Sexual Activity   • Alcohol use: No     Frequency: Never   • Drug use: No   • Sexual activity: Yes     Partners: Male     Birth control/protection: None     History reviewed. No pertinent family history.      The following portions of the patient's history were reviewed and updated as appropriate:vital signs, allergies, current medications, past family history, past medical history, past social history, past surgical history and problem list.    Review of Systems -   /68   Wt 63.5 kg (140 lb)   LMP 11/10/2019 (Approximate)   BMI 24.03 kg/m²   Gastrointestinal: denies nausea and vomiting, denies constipation   Genitourinary: Frequency - denies urgency or burning with urination  All other systems reviewed and are negative    Objective     Physical Exam  Constitutional   The patient is alert, well developed & well nourished.   Neck    The neck is supple and the trachea is midline. The thyroid is not enlarged and there are no palpable nodules.   Breast   Inspection of the breast reveals symmetrical. The nipples are everted. No masses palpated  Respiratory  The patient is relaxed and breathes without effort. Lungs CTAB  Cardiovascular  Regular rate and rhythm without murmur -  Negative LE pitting edema  Gastrointestinal   The abdomen is soft and non tender. No hepatosplenomegaly  Genitourinary   - External Genitalia without erythema, lesions, or masses  -Vagina - There is no abnormal vaginal discharge.   -Cervix without discharge  Negative cervical motion tenderness   Uterus - uterine body size is 6-8 weeks  Adnexa structures are without masses  Perineum is without inflammation or lesion  Skin  Normal color. No rashes or lesions  Extremities  Full ROM. No rashes or edema  Psychiatric  The patient is oriented to person, place, and time. Speech is fluent and words are clear    Imaging   Pelvic ultrasound report  8w1d, + FHT      Assessment/Plan     ASSESSMENT  1. IUP at 8w4d   2. Low risk pregnancy  3.   Hx SAB    PLAN  1. Tests ordered today:  Orders Placed This Encounter   Procedures   • US Ob Transvaginal     Order Specific Question:   Reason for Exam:     Answer:   viability of pregnancy   • OB Panel With HIV     2. Medications prescribed today:  No orders of the defined types were placed in this encounter.    3. Information reviewed: exercise in pregnancy, nutrition in pregnancy, weight gain in pregnancy, work and travel restrictions during pregnancy, list of OTC medications acceptable in pregnancy and call coverage groups  4. Genetic testing reviewed: Cystic Fibrosis Screen  5. Pema products, Vit B6 supp, Unisom, or seabands PRN      Follow up: 4 week(s)         This note was electronically signed.    Mattie Lee CNM  1/9/2020

## 2020-01-10 LAB
ABO GROUP BLD: NORMAL
BASOPHILS # BLD AUTO: 0 X10E3/UL (ref 0–0.2)
BASOPHILS NFR BLD AUTO: 0 %
BLD GP AB SCN SERPL QL: NEGATIVE
EOSINOPHIL # BLD AUTO: 0 X10E3/UL (ref 0–0.4)
EOSINOPHIL NFR BLD AUTO: 0 %
ERYTHROCYTE [DISTWIDTH] IN BLOOD BY AUTOMATED COUNT: 13.3 % (ref 11.7–15.4)
HBV SURFACE AG SERPL QL IA: NEGATIVE
HCT VFR BLD AUTO: 38.2 % (ref 34–46.6)
HCV AB S/CO SERPL IA: <0.1 S/CO RATIO (ref 0–0.9)
HGB BLD-MCNC: 13 G/DL (ref 11.1–15.9)
HIV 1+2 AB+HIV1 P24 AG SERPL QL IA: NON REACTIVE
IMM GRANULOCYTES # BLD AUTO: 0 X10E3/UL (ref 0–0.1)
IMM GRANULOCYTES NFR BLD AUTO: 1 %
LYMPHOCYTES # BLD AUTO: 1.9 X10E3/UL (ref 0.7–3.1)
LYMPHOCYTES NFR BLD AUTO: 24 %
MCH RBC QN AUTO: 30 PG (ref 26.6–33)
MCHC RBC AUTO-ENTMCNC: 34 G/DL (ref 31.5–35.7)
MCV RBC AUTO: 88 FL (ref 79–97)
MONOCYTES # BLD AUTO: 0.6 X10E3/UL (ref 0.1–0.9)
MONOCYTES NFR BLD AUTO: 8 %
NEUTROPHILS # BLD AUTO: 5.5 X10E3/UL (ref 1.4–7)
NEUTROPHILS NFR BLD AUTO: 67 %
PLATELET # BLD AUTO: 235 X10E3/UL (ref 150–450)
RBC # BLD AUTO: 4.34 X10E6/UL (ref 3.77–5.28)
RH BLD: NEGATIVE
RPR SER QL: NON REACTIVE
RUBV IGG SERPL IA-ACNC: 1.3 INDEX
WBC # BLD AUTO: 8.2 X10E3/UL (ref 3.4–10.8)

## 2020-01-15 DIAGNOSIS — Z34.81 ENCOUNTER FOR SUPERVISION OF OTHER NORMAL PREGNANCY IN FIRST TRIMESTER: ICD-10-CM

## 2020-02-10 ENCOUNTER — ROUTINE PRENATAL (OUTPATIENT)
Dept: OBSTETRICS AND GYNECOLOGY | Facility: CLINIC | Age: 30
End: 2020-02-10

## 2020-02-10 VITALS — SYSTOLIC BLOOD PRESSURE: 118 MMHG | DIASTOLIC BLOOD PRESSURE: 62 MMHG | BODY MASS INDEX: 24.72 KG/M2 | WEIGHT: 144 LBS

## 2020-02-10 DIAGNOSIS — O09.299 HISTORY OF MISCARRIAGE, CURRENTLY PREGNANT: ICD-10-CM

## 2020-02-10 DIAGNOSIS — Z86.59 HISTORY OF POSTPARTUM DEPRESSION: ICD-10-CM

## 2020-02-10 DIAGNOSIS — O09.91 ENCOUNTER FOR SUPERVISION OF HIGH RISK PREGNANCY IN FIRST TRIMESTER, ANTEPARTUM: Primary | ICD-10-CM

## 2020-02-10 DIAGNOSIS — Z87.59 HISTORY OF POSTPARTUM DEPRESSION: ICD-10-CM

## 2020-02-10 PROCEDURE — 99213 OFFICE O/P EST LOW 20 MIN: CPT | Performed by: OBSTETRICS & GYNECOLOGY

## 2020-02-10 NOTE — PROGRESS NOTES
Chief Complaint   Patient presents with   • Routine Prenatal Visit     No complaints.        HPI: Ines is a  currently at 13w1d who today reports the following:  Nausea - No; Vaginal bleeding -  No; Heartburn - No.  The patient is without complaints.  Patient had labs last visit.  Patient denies any vaginal bleeding or spotting.  The patient did have 3 previous successful pregnancies.  Patient then had 2 miscarriages.  Patient's last miscarriage was 1 year ago.  Patient does have a history of postpartum depression.  Patient is currently not on any medications.  She is taking her prenatal vitamins.  ROS:   GI:   Nausea - No; Constipation - No; Diarrhea - No.   Neuro:  Headache - No; Visual disturbances - No.    EXAM:   Vitals:  See prenatal flowsheet as noted and reviewed   General: Alert, cooperative, and does not appear in any distress   Lungs:  Respirations regular, even and unlabored   Abdomen:   See prenatal flowsheet as noted and reviewed     Uterus gravid, non-tender; no palpable masses     No guarding or rebound tenderness   Pelvic:  See prenatal flowsheet as noted and reviewed   Ext:  See prenatal flowsheet as noted and reviewed     Moves extremities well, no cyanosis and no redness   Skin:  No bleeding, bruising or rash and no lesions noted   Psych:  Normal mood and affect, oriented and thought content appropriate   Urine:  See prenatal flowsheet as noted and reviewed    Prenatal Labs  Lab Results   Component Value Date    HGB 13.0 2020    RUBELLAABIGG 1.30 2020    HEPBSAG Negative 2020    ABO O 2020    RH Negative 2020    ABSCRN Negative 2020    [id number] Non Reactive 2020    HEPCVIRUSABY <0.1 2020       MDM:  Impression: Supervision of high risk pregnancy  history of miscarriage   History of pp depression   Tests done today:  I reviewed with the patient her labs from last visit as well as cultures.   Topics discussed: ab precautions  genetic  screening - Today we discussed genetic testing.  She is aware that the MSAFP-4 and NIPT - [id number] is a screening test.  A screening test is not a diagnostic test.  This means that a negative test does not guarantee an unaffected fetus and a positive test does not mean the fetus has the condition for which the test is being performed.  If the test returns positive, a diagnostic test should be consider to determine if the fetus in fact has the condition.  After considering the options previously presented, she is not interested in having genetic testing performed.   Tests next visit: U/S for anatomic screening   Next visit: See prenatal flowsheet     This note was electronically signed.  Zuleika Sinclair M.D.

## 2020-03-11 ENCOUNTER — TELEPHONE (OUTPATIENT)
Dept: PRIMARY CARE CLINIC | Age: 30
End: 2020-03-11

## 2020-03-11 NOTE — TELEPHONE ENCOUNTER
Patient called and is requesting to change her PCP to Dacia Hagan; She is currently established with Dr Stevan Reed. Please contact patient back. Thank you.

## 2020-03-11 NOTE — TELEPHONE ENCOUNTER
Left message for patient to call back, Patient is switched to [facility] and can be scheduled for a appointment.

## 2020-03-16 ENCOUNTER — ROUTINE PRENATAL (OUTPATIENT)
Dept: OBSTETRICS AND GYNECOLOGY | Facility: CLINIC | Age: 30
End: 2020-03-16

## 2020-03-16 VITALS — WEIGHT: 148 LBS | SYSTOLIC BLOOD PRESSURE: 120 MMHG | DIASTOLIC BLOOD PRESSURE: 70 MMHG | BODY MASS INDEX: 25.4 KG/M2

## 2020-03-16 DIAGNOSIS — Z36.89 ENCOUNTER FOR FETAL ANATOMIC SURVEY: ICD-10-CM

## 2020-03-16 DIAGNOSIS — Z34.93 PRENATAL CARE IN THIRD TRIMESTER: Primary | ICD-10-CM

## 2020-03-16 PROCEDURE — 99213 OFFICE O/P EST LOW 20 MIN: CPT | Performed by: OBSTETRICS & GYNECOLOGY

## 2020-03-17 NOTE — PROGRESS NOTES
Chief Complaint   Patient presents with   • Routine Prenatal Visit     Anatomy scan done today, no complaints.       HPI:   Ines is a  currently at 18w1d who today reports the following:  Contractions - No; Leaking - No; Vaginal bleeding -  No; Swelling of extremities - No. Good fetal movement - YES.    ROS:  GI: Nausea - No; Constipation - No; Diarrhea - No. RUQ pain - No    Neuro: Headache - No; Visual disturbances - No.    Pertinent items are noted in HPI, all other systems reviewed and negative    Review of History:  The following portions of the patient's history were reviewed and updated as appropriate:problem list, current medications, allergies, past family history, past medical history, past social history and past surgical history.    Current Outpatient Medications on File Prior to Visit   Medication Sig Dispense Refill   • Prenatal Vit-Fe Fumarate-FA (PRENATAL VITAMIN 27-0.8) 27-0.8 MG tablet tablet Take 1 tablet by mouth Daily.     • sertraline (ZOLOFT) 50 MG tablet Take 1 tablet by mouth Daily. 30 tablet 5     No current facility-administered medications on file prior to visit.        EXAM:  /70   Wt 67.1 kg (148 lb)   LMP 11/10/2019 (Approximate)   BMI 25.40 kg/m²     Gen: NAD, conversant  Pulm: No use of accessory muscles, normal respirations  Abdomen: Gravid, nontender, size = dates, + fetal cardiac activity  Ext: no edema, no rashes, WWP  Gait: normal for pregnancy  Psych: Mood, insight, judgement intact  SVE: Not performed     Lab Results   Component Value Date    ABO O 2020    RH Negative 2020    ABSCRN Negative 2020       Social History    Tobacco Use      Smoking status: Never Smoker      Smokeless tobacco: Never Used      I have reviewed the prenatal labs and previous ultrasounds today.    MDM:  Diagnosis: Supervision of low risk pregnancy   History of SAB  History of PP depression   Tests/Orders/Rx today: Orders Placed This Encounter   Procedures   • US Ob  14 + Weeks Single or First Gestation     Order Specific Question:   Reason for Exam:     Answer:   anatomy scan   IUP at 18+1 weeks. Anatomy was completely cleared today and all organ systems were found to be normal in appearance. EFW 225g(43%). Cephalic presentation. Placenta is anterior. Amniotic fluid and fetal heart rate are normal.  Meds Ordered: none   Topics discussed: U/S findings   Mood stable on Zoloft   Tests next visit: none   Next visit: 4 week(s)     Rishi Torres MD  Obstetrics and Gynecology  [facility]

## 2020-03-18 ENCOUNTER — OFFICE VISIT (OUTPATIENT)
Dept: PRIMARY CARE CLINIC | Age: 30
End: 2020-03-18
Payer: MEDICAID

## 2020-03-18 VITALS
BODY MASS INDEX: 25.27 KG/M2 | DIASTOLIC BLOOD PRESSURE: 60 MMHG | SYSTOLIC BLOOD PRESSURE: 104 MMHG | WEIGHT: 147.2 LBS | OXYGEN SATURATION: 99 % | HEART RATE: 92 BPM

## 2020-03-18 PROCEDURE — 99213 OFFICE O/P EST LOW 20 MIN: CPT | Performed by: NURSE PRACTITIONER

## 2020-03-18 PROCEDURE — 1036F TOBACCO NON-USER: CPT | Performed by: NURSE PRACTITIONER

## 2020-03-18 PROCEDURE — G8419 CALC BMI OUT NRM PARAM NOF/U: HCPCS | Performed by: NURSE PRACTITIONER

## 2020-03-18 PROCEDURE — G8484 FLU IMMUNIZE NO ADMIN: HCPCS | Performed by: NURSE PRACTITIONER

## 2020-03-18 PROCEDURE — G8427 DOCREV CUR MEDS BY ELIG CLIN: HCPCS | Performed by: NURSE PRACTITIONER

## 2020-03-18 RX ORDER — SERTRALINE HYDROCHLORIDE 25 MG/1
25 TABLET, FILM COATED ORAL DAILY
COMMUNITY
End: 2020-03-30

## 2020-03-18 ASSESSMENT — PATIENT HEALTH QUESTIONNAIRE - PHQ9
SUM OF ALL RESPONSES TO PHQ9 QUESTIONS 1 & 2: 0
SUM OF ALL RESPONSES TO PHQ QUESTIONS 1-9: 0
1. LITTLE INTEREST OR PLEASURE IN DOING THINGS: 0
2. FEELING DOWN, DEPRESSED OR HOPELESS: 0
SUM OF ALL RESPONSES TO PHQ QUESTIONS 1-9: 0

## 2020-03-18 ASSESSMENT — ENCOUNTER SYMPTOMS
EYE PAIN: 0
NAUSEA: 0
SHORTNESS OF BREATH: 0
COUGH: 0
SORE THROAT: 0
ABDOMINAL PAIN: 0
VOMITING: 0

## 2020-03-18 NOTE — PROGRESS NOTES
Chief Complaint   Patient presents with    Depression     Patient here today she states she needs to get back on her antidepressant. She has been taking her  some he had left over cutting them in half. She also had been having really bad headaches.  Headache       Have you seen any other physician or provider since your last visit? yes - OB/GYN    Have you had any other diagnostic tests since your last visit? no    Have you changed or stopped any medications since your last visit including any over-the-counter medicines, vitamins, or herbal medicines? no     Are you taking all your prescribed medications? Yes  If NO, why? -  N/A    I have recommended that this patient have a flu shot but she declines at this time. I have discussed the risks and benefits of this examination with her. The patient verbalizes understanding. REVIEW OF SYSTEMS:  Review of Systems   Constitutional: Negative for chills and fever. HENT: Negative for ear pain and sore throat. Eyes: Negative for pain and visual disturbance. Respiratory: Negative for cough and shortness of breath. Cardiovascular: Negative for chest pain, palpitations and leg swelling. Gastrointestinal: Negative for abdominal pain, nausea and vomiting. Genitourinary: Negative for dysuria and hematuria. Musculoskeletal: Negative for joint swelling. Skin: Negative for rash. Neurological: Positive for headaches. Negative for dizziness and weakness. Psychiatric/Behavioral: Negative for sleep disturbance.

## 2020-03-19 ENCOUNTER — TELEPHONE (OUTPATIENT)
Dept: OBSTETRICS AND GYNECOLOGY | Facility: CLINIC | Age: 30
End: 2020-03-19

## 2020-03-19 ENCOUNTER — TELEPHONE (OUTPATIENT)
Dept: PRIMARY CARE CLINIC | Age: 30
End: 2020-03-19

## 2020-03-19 RX ORDER — AZITHROMYCIN 250 MG/1
250 TABLET, FILM COATED ORAL SEE ADMIN INSTRUCTIONS
Qty: 6 TABLET | Refills: 0 | Status: SHIPPED | OUTPATIENT
Start: 2020-03-19 | End: 2020-03-24

## 2020-03-19 RX ORDER — MAGNESIUM OXIDE 400 MG/1
400 TABLET ORAL DAILY
Qty: 30 TABLET | Refills: 0 | Status: SHIPPED | OUTPATIENT
Start: 2020-03-19 | End: 2020-04-18

## 2020-03-19 RX ORDER — OSELTAMIVIR PHOSPHATE 75 MG/1
75 CAPSULE ORAL 2 TIMES DAILY
Qty: 10 CAPSULE | Refills: 0 | Status: SHIPPED | OUTPATIENT
Start: 2020-03-19 | End: 2020-05-18

## 2020-03-19 NOTE — TELEPHONE ENCOUNTER
Pt called requesting an antibx. States she is feeling worse today.  Pt also wanted to remind you that she is 19 wks pregnant

## 2020-03-19 NOTE — TELEPHONE ENCOUNTER
----- Message from Carmelita Arreguin sent at 3/19/2020  1:16 PM EDT -----  Contact: PATIENT  Patient's children have been diagnosed with the flu. Patient now has a low grade temp with body aches & cough    Amando Braun  Patient call back: [phone]

## 2020-03-26 ENCOUNTER — VIRTUAL VISIT (OUTPATIENT)
Dept: PRIMARY CARE CLINIC | Age: 30
End: 2020-03-26
Payer: MEDICAID

## 2020-03-26 PROCEDURE — 99441 PR PHYS/QHP TELEPHONE EVALUATION 5-10 MIN: CPT | Performed by: NURSE PRACTITIONER

## 2020-03-26 ASSESSMENT — ENCOUNTER SYMPTOMS
SHORTNESS OF BREATH: 0
COUGH: 1
EYE PAIN: 0
NAUSEA: 0
CHEST TIGHTNESS: 1
VOMITING: 0
ABDOMINAL PAIN: 0
SORE THROAT: 0

## 2020-03-30 ENCOUNTER — VIRTUAL VISIT (OUTPATIENT)
Dept: PRIMARY CARE CLINIC | Age: 30
End: 2020-03-30
Payer: MEDICAID

## 2020-03-30 PROCEDURE — 99441 PR PHYS/QHP TELEPHONE EVALUATION 5-10 MIN: CPT | Performed by: INTERNAL MEDICINE

## 2020-03-30 RX ORDER — HYDROCODONE POLISTIREX AND CHLORPHENIRAMINE POLISTIREX 10; 8 MG/5ML; MG/5ML
5 SUSPENSION, EXTENDED RELEASE ORAL EVERY 12 HOURS PRN
Qty: 70 ML | Refills: 0 | Status: SHIPPED | OUTPATIENT
Start: 2020-03-30 | End: 2020-04-06

## 2020-03-30 ASSESSMENT — ENCOUNTER SYMPTOMS
VOMITING: 0
ABDOMINAL PAIN: 0
COUGH: 1
WHEEZING: 0
BACK PAIN: 0
EYE DISCHARGE: 0
SINUS PRESSURE: 0
SORE THROAT: 0
NAUSEA: 0
SHORTNESS OF BREATH: 0

## 2020-03-30 NOTE — PROGRESS NOTES
SUBJECTIVE:    Patient ID: Moira Baker is a 26 y. o.female. Chief Complaint   Patient presents with    Cough    Other     back pain around lung     HPI:  Patient has been having upper respiratory symptoms for about three weeks. She has not been traveling. She does not know if she has been in contact with anyone with COVID-19. Her  does work in a factory in [place]. Her son did have the flu recently. She has not had a fever for the past week or so. Patient reported having mainly a dry cough for the past few weeks. She reported having pain in the right side of her chest when cough or taking a deep breath. She denies any chest pain otherwise. She reported no shortness of breath. She denies any swelling in lower extremities patient was treated recently with Tamiflu and Zithromax. She feels her other symptoms are improving but her cough is not. She has been taking cough drops with no relief. Patient is also 20 weeks pregnant. She reported no issues with her pregnancy. Patient's medications, allergies, past medical, surgical, social and family histories were reviewed and updated as appropriate in the electronic medical record/chart. Outpatient Medications Marked as Taking for the 3/30/20 encounter (Virtual Visit) with Sameera Melgar MD   Medication Sig Dispense Refill    Prenatal Vit-Fe Fumarate-FA (PRENATAL VITAMINS PLUS PO) Take by mouth      sertraline (ZOLOFT) 50 MG tablet Take 1 tablet by mouth daily 30 tablet 5        Review of Systems   Constitutional: Negative for chills and fever. HENT: Positive for congestion. Negative for sinus pressure and sore throat. Eyes: Negative for discharge and visual disturbance. Respiratory: Positive for cough. Negative for shortness of breath and wheezing. Cardiovascular: Negative for chest pain and palpitations. As in HPI   Gastrointestinal: Negative for abdominal pain, nausea and vomiting.    Endocrine: Negative for cold intolerance and heat intolerance. Genitourinary: Negative for dysuria, frequency and urgency. Musculoskeletal: Negative for arthralgias and back pain. Skin: Negative for rash and wound. Neurological: Negative for syncope, numbness and headaches. Hematological: Negative. Psychiatric/Behavioral: Negative for agitation and sleep disturbance. The patient is not nervous/anxious. Past Medical History:   Diagnosis Date    Colitis      No past surgical history on file. Family History   Problem Relation Age of Onset    No Known Problems Mother     No Known Problems Father       Social History     Tobacco Use   Smoking Status Never Smoker   Smokeless Tobacco Never Used       OBJECTIVE:   Wt Readings from Last 3 Encounters:   03/18/20 147 lb 3.2 oz (66.8 kg)   07/30/19 130 lb (59 kg)   07/22/19 131 lb (59.4 kg)     BP Readings from Last 3 Encounters:   03/18/20 104/60   07/30/19 110/70   07/22/19 103/67       LMP 07/10/2019      Physical Exam  Patient seems to be in no acute distress over the phone. Her speech is normal.  She did cough several times during this conversation. Cough seems to be dry. No audible wheezing. Lab Results   Component Value Date     11/19/2017    K 3.3 11/19/2017     11/19/2017    CO2 26 11/19/2017    GLUCOSE 107 11/19/2017    BUN 11 11/19/2017    CREATININE 0.7 11/19/2017    CALCIUM 9.2 11/19/2017    PROT 7.5 11/19/2017    LABALBU 4.6 11/19/2017    BILITOT 0.7 11/19/2017    ALT 13 11/19/2017    AST 17 11/19/2017       Microscopic Examination (no units)   Date Value   11/19/2017 Not Indicated         Lab Results   Component Value Date    WBC 5.5 11/19/2017    NEUTROABS 2.9 11/19/2017    HGB 13.9 11/19/2017    HCT 40.1 11/19/2017    MCV 88.5 11/19/2017     11/19/2017       No results found for: TSH    ASSESSMENT/PLAN:     1. Cough  2. Pleuritic chest pain  Afebrile per her report. . Been having symptoms for the past 2-3 weeks. Recent exposure to flu.   Feeling better other than cough and pain. Monitor for now, treat sx. Patient to call with any worsening sx. Discussed case with Dr. Joslyn Matt (her OB) she did okay Tussinex for a few days. Patient to call in next few days if no better or with any worsening of her symptoms. Spent 9 min    Orders Placed This Encounter   Medications    HYDROcodone-chlorpheniramine (TUSSIONEX PENNKINETIC ER) 10-8 MG/5ML SUER     Sig: Take 5 mLs by mouth every 12 hours as needed (cough) for up to 7 days. Dispense:  70 mL     Refill:  0     Reduce doses taken as pain becomes manageable      Written by Misael Olson, acting as a scribe for Dr. Huber Mandel on 3/30/2020 at 12:37 PM.     I, Dr. Javad Zhang, personally performed the services described in the documentation as scribed by Ronda Chawla CMA, in my presence and it is both accurate and complete.

## 2020-04-06 ENCOUNTER — TELEPHONE (OUTPATIENT)
Dept: OBSTETRICS AND GYNECOLOGY | Facility: CLINIC | Age: 30
End: 2020-04-06

## 2020-04-06 NOTE — TELEPHONE ENCOUNTER
----- Message from Keren Crow sent at 4/6/2020  3:43 PM EDT -----  Contact: PT  OB PT IS 21W1D AND CALLED STATING SHE HAS A CRACK IN HER TOOTH AND IT'S THROBBING.  SHE IS UNABLE TO GET IN WITH A DENTIST DUE TO COVID-19.  WHAT SHOULD SHE DO?  THANKS

## 2020-05-18 ENCOUNTER — ROUTINE PRENATAL (OUTPATIENT)
Dept: OBSTETRICS AND GYNECOLOGY | Facility: CLINIC | Age: 30
End: 2020-05-18

## 2020-05-18 VITALS — DIASTOLIC BLOOD PRESSURE: 60 MMHG | BODY MASS INDEX: 26.09 KG/M2 | WEIGHT: 152 LBS | SYSTOLIC BLOOD PRESSURE: 124 MMHG

## 2020-05-18 DIAGNOSIS — Z34.92 NORMAL PREGNANCY, SECOND TRIMESTER: Primary | ICD-10-CM

## 2020-05-18 PROCEDURE — 99213 OFFICE O/P EST LOW 20 MIN: CPT | Performed by: NURSE PRACTITIONER

## 2020-05-18 NOTE — PROGRESS NOTES
[id number]  Chief Complaint   Patient presents with   • Routine Prenatal Visit     no complaints         HPI  Ines is a  currently at 27w1d who today reports the following:    No c/o   Good FM   Never has had GDM with other pregnancies    Contractions - No;   Leaking - No;   Vaginal bleeding -  No;   Swelling of extremities - No.       ROS:        GI: Nausea - No;   Constipation - No;   Diarrhea - No    Neuro: Headache - No;   Visual change - No    Pertinent items are noted in HPI, all other systems reviewed and negative     Social History     Socioeconomic History   • Marital status:      Spouse name: Not on file   • Number of children: Not on file   • Years of education: Not on file   • Highest education level: Not on file   Tobacco Use   • Smoking status: Never Smoker   • Smokeless tobacco: Never Used   Substance and Sexual Activity   • Alcohol use: No     Frequency: Never   • Drug use: No   • Sexual activity: Yes     Partners: Male     Birth control/protection: None          EXAM  /60   Wt 68.9 kg (152 lb)   LMP 11/10/2019 (Approximate)   BMI 26.09 kg/m²  -See Prenatal Assessment  General Appearance:  Pleasant  Lungs: Breathing unlabored  Abdomen:  See flow sheet for Fundal ht, FM, FHT's  LE: Neg edema  V/E: Not performed     Lab Results   Component Value Date    ABO O 2020    RH Negative 2020    ABSCRN Negative 2020       MDM  Impression: Supervision of normal pregnancy   RH neg   No glucola yet    Tests done today: none   Topics discussed: continue to note good FM  T-dap   importance of glucola - rto for glucola this week   Rhogam 28 wks   encouraged questions - call prn    Tests next visit: glucola asap         Current Outpatient Medications:   •  Prenatal Vit-Fe Fumarate-FA (PRENATAL VITAMIN 27-0.8) 27-0.8 MG tablet tablet, Take 1 tablet by mouth Daily., Disp: , Rfl:   •  sertraline (ZOLOFT) 50 MG tablet, Take 1 tablet by mouth Daily., Disp: 30 tablet, Rfl: 5    Past  Surgical History:   Procedure Laterality Date   • COLONOSCOPY     • D&C WITH SUCTION N/A 2019    Procedure: DILATATION AND CURETTAGE WITH SUCTION;  Surgeon: Aleksey Danielle MD;  Location: [facility];  Service: Obstetrics/Gynecology   • DILATATION AND CURETTAGE         OB History        6    Para   3    Term   3            AB   2    Living   3       SAB   2    TAB        Ectopic        Molar        Multiple        Live Births   3                Past Medical History:   Diagnosis Date   • Kidney stones    • Rh negative status during pregnancy 2018   • Wears contact lenses        History reviewed. No pertinent family history.

## 2020-06-08 ENCOUNTER — ROUTINE PRENATAL (OUTPATIENT)
Dept: OBSTETRICS AND GYNECOLOGY | Facility: CLINIC | Age: 30
End: 2020-06-08

## 2020-06-08 VITALS — DIASTOLIC BLOOD PRESSURE: 70 MMHG | SYSTOLIC BLOOD PRESSURE: 112 MMHG | WEIGHT: 155 LBS | BODY MASS INDEX: 26.61 KG/M2

## 2020-06-08 DIAGNOSIS — Z3A.30 30 WEEKS GESTATION OF PREGNANCY: Primary | ICD-10-CM

## 2020-06-08 DIAGNOSIS — Z34.93 THIRD TRIMESTER PREGNANCY: Primary | ICD-10-CM

## 2020-06-08 PROCEDURE — 99213 OFFICE O/P EST LOW 20 MIN: CPT | Performed by: OBSTETRICS & GYNECOLOGY

## 2020-06-08 PROCEDURE — 96372 THER/PROPH/DIAG INJ SC/IM: CPT | Performed by: OBSTETRICS & GYNECOLOGY

## 2020-06-08 NOTE — PROGRESS NOTES
Chief Complaint   Patient presents with   • Routine Prenatal Visit     Glucola today, Rhogam today, No C/C         HPI:   , 30w1d gestation reports doing well    ROS:  See Prenatal Episode/Flowsheet  /70   Wt 70.3 kg (155 lb)   LMP 11/10/2019 (Approximate)   BMI 26.61 kg/m²      EXAM:  EXTREMITIES:  No swelling-See Prenatal Episode/Flowsheet    ABDOMEN:  FHTs/Movement noted-See Prenatal Episode/Flowsheet    URINE GLUCOSE/PROTEIN:  See Prenatal Episode/Flowsheet    PELVIC EXAM:  See Prenatal Episode/Flowsheet  CV:  Lungs:  GYN:    MDM:    Lab Results   Component Value Date    HGB 13.0 2020    RUBELLAABIGG 1.30 2020    HEPBSAG Negative 2020    ABO O 2020    RH Negative 2020    ABSCRN Negative 2020    [id number] Non Reactive 2020    HEPCVIRUSABY <0.1 2020       U/S:    1. IUP 30w1d  2. Routine care   3. Rhogam today  4. Growth U/S next time

## 2020-06-09 LAB
BASOPHILS # BLD AUTO: 0.03 10*3/MM3 (ref 0–0.2)
BASOPHILS NFR BLD AUTO: 0.3 % (ref 0–1.5)
BUN SERPL-MCNC: 4 MG/DL (ref 6–20)
BUN/CREAT SERPL: 7.5 (ref 7–25)
CALCIUM SERPL-MCNC: 8.4 MG/DL (ref 8.6–10.5)
CHLORIDE SERPL-SCNC: 104 MMOL/L (ref 98–107)
CO2 SERPL-SCNC: 21.6 MMOL/L (ref 22–29)
CREAT SERPL-MCNC: 0.53 MG/DL (ref 0.57–1)
EOSINOPHIL # BLD AUTO: 0.04 10*3/MM3 (ref 0–0.4)
EOSINOPHIL NFR BLD AUTO: 0.4 % (ref 0.3–6.2)
ERYTHROCYTE [DISTWIDTH] IN BLOOD BY AUTOMATED COUNT: 13.7 % (ref 12.3–15.4)
GLUCOSE 1H P 50 G GLC PO SERPL-MCNC: 134 MG/DL (ref 65–179)
GLUCOSE SERPL-MCNC: 134 MG/DL (ref 65–99)
HCT VFR BLD AUTO: 32.1 % (ref 34–46.6)
HGB BLD-MCNC: 11 G/DL (ref 12–15.9)
IMM GRANULOCYTES # BLD AUTO: 0.07 10*3/MM3 (ref 0–0.05)
IMM GRANULOCYTES NFR BLD AUTO: 0.8 % (ref 0–0.5)
LYMPHOCYTES # BLD AUTO: 1.36 10*3/MM3 (ref 0.7–3.1)
LYMPHOCYTES NFR BLD AUTO: 15.1 % (ref 19.6–45.3)
MCH RBC QN AUTO: 31.4 PG (ref 26.6–33)
MCHC RBC AUTO-ENTMCNC: 34.3 G/DL (ref 31.5–35.7)
MCV RBC AUTO: 91.7 FL (ref 79–97)
MONOCYTES # BLD AUTO: 0.44 10*3/MM3 (ref 0.1–0.9)
MONOCYTES NFR BLD AUTO: 4.9 % (ref 5–12)
NEUTROPHILS # BLD AUTO: 7.07 10*3/MM3 (ref 1.7–7)
NEUTROPHILS NFR BLD AUTO: 78.5 % (ref 42.7–76)
NRBC BLD AUTO-RTO: 0.1 /100 WBC (ref 0–0.2)
PLATELET # BLD AUTO: 171 10*3/MM3 (ref 140–450)
POTASSIUM SERPL-SCNC: 3.2 MMOL/L (ref 3.5–5.2)
RBC # BLD AUTO: 3.5 10*6/MM3 (ref 3.77–5.28)
SODIUM SERPL-SCNC: 137 MMOL/L (ref 136–145)
WBC # BLD AUTO: 9.01 10*3/MM3 (ref 3.4–10.8)

## 2020-06-09 RX ORDER — FERROUS SULFATE 325(65) MG
325 TABLET ORAL 2 TIMES DAILY
Qty: 60 TABLET | Refills: 6 | Status: SHIPPED | OUTPATIENT
Start: 2020-06-09 | End: 2020-07-09

## 2020-06-09 NOTE — PROGRESS NOTES
This is a basic metabolic panel.  The patient should have had a CBC drawn.  If they can add this great if not she will need it redrawn that we can wait for next appointment.  Of note her potassium is slightly low and she may improve levels with bananas and orange juice and other foods rich in potassium.  Thanks

## 2020-06-22 ENCOUNTER — ROUTINE PRENATAL (OUTPATIENT)
Dept: OBSTETRICS AND GYNECOLOGY | Facility: CLINIC | Age: 30
End: 2020-06-22

## 2020-06-22 VITALS — BODY MASS INDEX: 27.29 KG/M2 | WEIGHT: 159 LBS | SYSTOLIC BLOOD PRESSURE: 118 MMHG | DIASTOLIC BLOOD PRESSURE: 62 MMHG

## 2020-06-22 DIAGNOSIS — O99.019 IRON DEFICIENCY ANEMIA DURING PREGNANCY: ICD-10-CM

## 2020-06-22 DIAGNOSIS — O09.93 ENCOUNTER FOR SUPERVISION OF HIGH RISK PREGNANCY IN THIRD TRIMESTER, ANTEPARTUM: Primary | ICD-10-CM

## 2020-06-22 DIAGNOSIS — Z86.59 HISTORY OF POSTPARTUM DEPRESSION: ICD-10-CM

## 2020-06-22 DIAGNOSIS — D50.9 IRON DEFICIENCY ANEMIA DURING PREGNANCY: ICD-10-CM

## 2020-06-22 DIAGNOSIS — O26.893 RH NEGATIVE STATUS DURING PREGNANCY IN THIRD TRIMESTER: ICD-10-CM

## 2020-06-22 DIAGNOSIS — Z87.59 HISTORY OF POSTPARTUM DEPRESSION: ICD-10-CM

## 2020-06-22 DIAGNOSIS — F33.0 MILD EPISODE OF RECURRENT MAJOR DEPRESSIVE DISORDER (HCC): ICD-10-CM

## 2020-06-22 DIAGNOSIS — Z36.89 ENCOUNTER FOR ULTRASOUND TO ASSESS FETAL GROWTH: ICD-10-CM

## 2020-06-22 DIAGNOSIS — Z67.91 RH NEGATIVE STATUS DURING PREGNANCY IN THIRD TRIMESTER: ICD-10-CM

## 2020-06-22 LAB
GLUCOSE UR STRIP-MCNC: NEGATIVE MG/DL
PROT UR STRIP-MCNC: NEGATIVE MG/DL

## 2020-06-22 PROCEDURE — 99214 OFFICE O/P EST MOD 30 MIN: CPT | Performed by: OBSTETRICS & GYNECOLOGY

## 2020-06-22 NOTE — PROGRESS NOTES
Chief Complaint   Patient presents with   • Routine Prenatal Visit     Growth scan today, no complaints.        HPI: Ines is a  currently at 32w1d who today reports the following:  Contractions - No; Leaking - No; Vaginal bleeding -  No; Heartburn - No.  The patient is without complaints.  She reports good fetal movement.  Patient did have a CBC as well as BMP last visit.  Patient has been informed regarding her potassium as well as CBC.  And reports she is taking her prenatal vitamins as well as iron supplements.  Patient did receive her RhoGam previously.  Patient is currently on her Zoloft.  Patient is taking 50 mg daily.  Patient reports a history of depression.  Patient has been on her medication throughout pregnancy.  Patient reports she feels at present symptoms are controlled.  Patient is concerned however as she has had a history of postpartum depression as well.  ROS:   GI:   Nausea - No; Constipation - No; Diarrhea - No.   Neuro:  Headache - No; Visual disturbances - No.    EXAM:   Vitals:  See prenatal flowsheet as noted and reviewed   General: Alert, cooperative, and does not appear in any distress   Lungs:  Respirations regular, even and unlabored   Abdomen:   See prenatal flowsheet as noted and reviewed     Uterus gravid, non-tender; no palpable masses     No guarding or rebound tenderness   Pelvic:  See prenatal flowsheet as noted and reviewed   Ext:  See prenatal flowsheet as noted and reviewed     Moves extremities well, no cyanosis and no redness   Skin:  No bleeding, bruising or rash and no lesions noted   Psych:  Normal mood and affect, oriented and thought content appropriate   Urine:  See prenatal flowsheet as noted and reviewed    Prenatal Labs  Lab Results   Component Value Date    HGB 11.0 (L) 2020    RUBELLAABIGG 1.30 2020    HEPBSAG Negative 2020    ABO O 2020    RH Negative 2020    ABSCRN Negative 2020    [id number] Non Reactive 2020     HEPCVIRUSABY <0.1 2020     US Ob Follow Up Transabdominal Approach  Ines Huntley  : 1990  MRN: [id number]  Date: 2020    Reason for exam/History:  Growth    Ultrasound images are reviewed.  There is noted to be a viable   intrauterine pregnancy. The pregnancy is measuring 33 weeks 1 days   gestation.  The estimated fetal weight is 2042 grams at the 58.9 % for   growth.  The fetal heart rate was normal.   The infant was in the vertex   presentation.  The placental location was noted to be anterior.  The   amniotic fluid index was 13.73 cms.    The exam limitations noted:  none    See ultrasound report for measurements and structures identified.    Zuleika Sinclair MD, RDAdvanced Care Hospital of [facility]  OB GYN [facility]    MDM:  Impression: Supervision of high risk pregnancy  Rh negative  Anemia in pregnancy  Depression  History of pp depression   Tests done today: U/S for EFW - see report   rx given for zoloft as noted   Topics discussed: iron supplementation  kick counts and fetal movement  PIH precautions   labor signs and symptoms   Continue zoloft as noted; increase dose post delivery as discussed unless need to before delivery  Continue iron supplements   Tests next visit: none   Total time spent today with Ines  was 30 minutes (level 4).  Greater than 50% of the time was spent face to face coordinating and planning care, answering her questions and counseling regarding pathophysiology of her presenting problem along with plans for any diagnositc work-up and treatment.  This note was electronically signed.  Zuleika Sinclair M.D.

## 2020-07-10 ENCOUNTER — ROUTINE PRENATAL (OUTPATIENT)
Dept: OBSTETRICS AND GYNECOLOGY | Facility: CLINIC | Age: 30
End: 2020-07-10

## 2020-07-10 VITALS — BODY MASS INDEX: 27.64 KG/M2 | WEIGHT: 161 LBS | SYSTOLIC BLOOD PRESSURE: 124 MMHG | DIASTOLIC BLOOD PRESSURE: 64 MMHG

## 2020-07-10 DIAGNOSIS — Z34.83 ENCOUNTER FOR SUPERVISION OF OTHER NORMAL PREGNANCY IN THIRD TRIMESTER: Primary | ICD-10-CM

## 2020-07-10 PROBLEM — O36.8390: Status: RESOLVED | Noted: 2018-12-31 | Resolved: 2020-07-10

## 2020-07-10 PROBLEM — O02.1 MISSED AB: Status: RESOLVED | Noted: 2018-12-31 | Resolved: 2020-07-10

## 2020-07-10 PROCEDURE — 99213 OFFICE O/P EST LOW 20 MIN: CPT | Performed by: MIDWIFE

## 2020-07-10 NOTE — PROGRESS NOTES
Chief Complaint   Patient presents with   • Routine Prenatal Visit     c/o pain and tingling in hands, swelling        HPI: Ines is a  currently at 34w5d who today reports the following:  Contractions - No; Leaking - No; Vaginal bleeding -  No; Swelling of extremities - hands mild; Baby is active - YES   She has been having tingling and throbbing pain in her hands which has worsened over the past few weeks. She takes Tylenol sometimes.    ROS:   GI:   Nausea - No; Constipation - No   Neuro:  Headache - No; Visual disturbances - No.    Social History    Tobacco Use      Smoking status: Never Smoker      Smokeless tobacco: Never Used      EXAM:   Vitals:  See prenatal flowsheet, /64, Wt +2#   Abdomen:   See prenatal flowsheet as noted and reviewed, soft, nontender   Pelvic:  See prenatal flowsheet   Urine:  See prenatal flowsheet as noted and reviewed    MDM:  Impression: Supervision of low risk pregnancy  Carpal Tunnel syndrome   Tests done today: none   Topics discussed: kick counts and fetal movement   labor signs and symptoms  Advised hand splints   Tests next visit: GBS testing   Next visit: 2 weeks     This note was electronically signed.  Mattei Lee, APRN  7/10/2020

## 2020-07-24 ENCOUNTER — ROUTINE PRENATAL (OUTPATIENT)
Dept: OBSTETRICS AND GYNECOLOGY | Facility: CLINIC | Age: 30
End: 2020-07-24

## 2020-07-24 VITALS — DIASTOLIC BLOOD PRESSURE: 62 MMHG | BODY MASS INDEX: 27.64 KG/M2 | WEIGHT: 161 LBS | SYSTOLIC BLOOD PRESSURE: 122 MMHG

## 2020-07-24 DIAGNOSIS — Z36.85 ANTENATAL SCREENING FOR STREPTOCOCCUS B: ICD-10-CM

## 2020-07-24 DIAGNOSIS — O26.899 CARPAL TUNNEL SYNDROME DURING PREGNANCY: ICD-10-CM

## 2020-07-24 DIAGNOSIS — Z34.83 ENCOUNTER FOR SUPERVISION OF OTHER NORMAL PREGNANCY IN THIRD TRIMESTER: Primary | ICD-10-CM

## 2020-07-24 DIAGNOSIS — G56.00 CARPAL TUNNEL SYNDROME DURING PREGNANCY: ICD-10-CM

## 2020-07-24 PROCEDURE — 99213 OFFICE O/P EST LOW 20 MIN: CPT | Performed by: OBSTETRICS & GYNECOLOGY

## 2020-07-26 NOTE — PROGRESS NOTES
Chief Complaint   Patient presents with   • Routine Prenatal Visit     GBS done today, no complaints.        HPI: Ines is a  currently at 37w0d who today reports the following:  Contractions - YES - but less than 4/hour AND no associated change in vaginal discharge; Leaking - No; Vaginal bleeding -  No; Heartburn - No.  Patient is without complaints other than continued tingling in her bilateral hands.  Patient is using a splint on her left hand.  ROS:   GI:   Nausea - No; Constipation - No; Diarrhea - No.   Neuro:  Headache - No; Visual disturbances - No.    EXAM:   Vitals:  See prenatal flowsheet as noted and reviewed   General: Alert, cooperative, and does not appear in any distress   Lungs:  Respirations regular, even and unlabored   Abdomen:   See prenatal flowsheet as noted and reviewed     Uterus gravid, non-tender; no palpable masses     No guarding or rebound tenderness   Pelvic:  See prenatal flowsheet as noted and reviewed   Ext:  See prenatal flowsheet as noted and reviewed     Moves extremities well, no cyanosis and no redness   Skin:  No bleeding, bruising or rash and no lesions noted   Psych:  Normal mood and affect, oriented and thought content appropriate   Urine:  See prenatal flowsheet as noted and reviewed    Prenatal Labs  Lab Results   Component Value Date    HGB 11.0 (L) 2020    RUBELLAABIGG 1.30 2020    HEPBSAG Negative 2020    ABO O 2020    RH Negative 2020    ABSCRN Negative 2020    [id number] Non Reactive 2020    HEPCVIRUSABY <0.1 2020       MDM:  Impression: Supervision of low risk pregnancy  Carpal tunnel   Tests done today: GBS testing   Topics discussed: kick counts and fetal movement  PIH precautions   labor signs and symptoms  splinting of bilateral wrists; elevation, limit sodium intake   Tests next visit: none     This note was electronically signed.  Zuleika Sinclair M.D.

## 2020-07-28 LAB
CLINDAMYCIN ISLT KB: ABNORMAL
GP B STREP DNA SPEC QL NAA+PROBE: POSITIVE
ORGANISM ID: ABNORMAL

## 2020-07-31 ENCOUNTER — ROUTINE PRENATAL (OUTPATIENT)
Dept: OBSTETRICS AND GYNECOLOGY | Facility: CLINIC | Age: 30
End: 2020-07-31

## 2020-07-31 VITALS — SYSTOLIC BLOOD PRESSURE: 118 MMHG | DIASTOLIC BLOOD PRESSURE: 78 MMHG | WEIGHT: 162.5 LBS | BODY MASS INDEX: 27.89 KG/M2

## 2020-07-31 DIAGNOSIS — Z34.93 NORMAL PREGNANCY, THIRD TRIMESTER: Primary | ICD-10-CM

## 2020-07-31 PROCEDURE — 99213 OFFICE O/P EST LOW 20 MIN: CPT | Performed by: NURSE PRACTITIONER

## 2020-07-31 NOTE — PROGRESS NOTES
[address]  Chief Complaint   Patient presents with   • Routine Prenatal Visit     C/O cramping        HPI  Ines is a  currently at 37w5d who today reports the following:    Good FM     Contractions - No;   Leaking - No;   Vaginal bleeding -  No;   Swelling of extremities - No.       ROS:        GI: Nausea - No;   Constipation - No;   Diarrhea - No    Neuro: Headache - No;   Visual change - No    Pertinent items are noted in HPI, all other systems reviewed and negative     Social History     Socioeconomic History   • Marital status:      Spouse name: Not on file   • Number of children: Not on file   • Years of education: Not on file   • Highest education level: Not on file   Tobacco Use   • Smoking status: Never Smoker   • Smokeless tobacco: Never Used   Substance and Sexual Activity   • Alcohol use: No     Frequency: Never   • Drug use: No   • Sexual activity: Yes     Partners: Male     Birth control/protection: None          EXAM  /78   Wt 73.7 kg (162 lb 8 oz)   LMP 11/10/2019 (Approximate)   BMI 27.89 kg/m²  -See Prenatal Assessment  General Appearance:  Pleasant  Lungs: Breathing unlabored  Abdomen:  See flow sheet for Fundal ht, FM, FHT's  Cephalic per leopolds   LE: Neg edema  V/E: FT outer os / closed inner os / 50% soft -2     Lab Results   Component Value Date    ABO O 2020    RH Negative 2020    ABSCRN Negative 2020       MDM  Impression: Supervision of normal pregnancy   GBS positive   Tests done today: none   Topics discussed: S/S labor and adeq FM/Kick Counts  antibiotics in labor   encouraged questions - call prn    Tests next visit: none         Current Outpatient Medications:   •  Prenatal Vit-Fe Fumarate-FA (PRENATAL VITAMIN 27-0.8) 27-0.8 MG tablet tablet, Take 1 tablet by mouth Daily., Disp: , Rfl:   •  sertraline (ZOLOFT) 50 MG tablet, Take 1 tablet by mouth Daily., Disp: 30 tablet, Rfl: 5    Past Surgical History:   Procedure Laterality Date   •  COLONOSCOPY     • D&C WITH SUCTION N/A 2019    Procedure: DILATATION AND CURETTAGE WITH SUCTION;  Surgeon: Aleksey Danielle MD;  Location: [facility];  Service: Obstetrics/Gynecology   • DILATATION AND CURETTAGE         OB History        6    Para   3    Term   3            AB   2    Living   3       SAB   2    TAB        Ectopic        Molar        Multiple        Live Births   3                Past Medical History:   Diagnosis Date   • Kidney stones    • Rh negative status during pregnancy 2018   • Wears contact lenses        No family history on file.

## 2020-08-05 ENCOUNTER — ROUTINE PRENATAL (OUTPATIENT)
Dept: OBSTETRICS AND GYNECOLOGY | Facility: CLINIC | Age: 30
End: 2020-08-05

## 2020-08-05 ENCOUNTER — PREP FOR SURGERY (OUTPATIENT)
Dept: OTHER | Facility: HOSPITAL | Age: 30
End: 2020-08-05

## 2020-08-05 VITALS — SYSTOLIC BLOOD PRESSURE: 120 MMHG | DIASTOLIC BLOOD PRESSURE: 70 MMHG | BODY MASS INDEX: 28.32 KG/M2 | WEIGHT: 165 LBS

## 2020-08-05 DIAGNOSIS — O99.820 GBS (GROUP B STREPTOCOCCUS CARRIER), +RV CULTURE, CURRENTLY PREGNANT: ICD-10-CM

## 2020-08-05 DIAGNOSIS — Z34.83 ENCOUNTER FOR SUPERVISION OF OTHER NORMAL PREGNANCY IN THIRD TRIMESTER: Primary | ICD-10-CM

## 2020-08-05 DIAGNOSIS — Z3A.39 39 WEEKS GESTATION OF PREGNANCY: Primary | ICD-10-CM

## 2020-08-05 PROCEDURE — 99213 OFFICE O/P EST LOW 20 MIN: CPT | Performed by: OBSTETRICS & GYNECOLOGY

## 2020-08-05 RX ORDER — OXYTOCIN/0.9 % SODIUM CHLORIDE 30/500 ML
85 PLASTIC BAG, INJECTION (ML) INTRAVENOUS ONCE
Status: CANCELLED | OUTPATIENT
Start: 2020-08-05 | End: 2020-08-05

## 2020-08-05 RX ORDER — ACETAMINOPHEN 325 MG/1
650 TABLET ORAL ONCE AS NEEDED
Status: CANCELLED | OUTPATIENT
Start: 2020-08-05

## 2020-08-05 RX ORDER — PROMETHAZINE HYDROCHLORIDE 12.5 MG/1
12.5 SUPPOSITORY RECTAL EVERY 4 HOURS PRN
Status: CANCELLED | OUTPATIENT
Start: 2020-08-05

## 2020-08-05 RX ORDER — PROMETHAZINE HYDROCHLORIDE 12.5 MG/1
12.5 TABLET ORAL EVERY 4 HOURS PRN
Status: CANCELLED | OUTPATIENT
Start: 2020-08-05

## 2020-08-05 RX ORDER — MORPHINE SULFATE 4 MG/ML
4 INJECTION, SOLUTION INTRAMUSCULAR; INTRAVENOUS EVERY 4 HOURS PRN
Status: CANCELLED | OUTPATIENT
Start: 2020-08-05 | End: 2020-08-15

## 2020-08-05 RX ORDER — OXYTOCIN/0.9 % SODIUM CHLORIDE 30/500 ML
1-20 PLASTIC BAG, INJECTION (ML) INTRAVENOUS
Status: CANCELLED | OUTPATIENT
Start: 2020-08-05

## 2020-08-05 RX ORDER — CARBOPROST TROMETHAMINE 250 UG/ML
250 INJECTION, SOLUTION INTRAMUSCULAR ONCE AS NEEDED
Status: CANCELLED | OUTPATIENT
Start: 2020-08-05 | End: 2020-08-06

## 2020-08-05 RX ORDER — METHYLERGONOVINE MALEATE 0.2 MG/ML
200 INJECTION INTRAVENOUS ONCE AS NEEDED
Status: CANCELLED | OUTPATIENT
Start: 2020-08-05 | End: 2020-08-06

## 2020-08-05 RX ORDER — PROMETHAZINE HYDROCHLORIDE 25 MG/ML
12.5 INJECTION, SOLUTION INTRAMUSCULAR; INTRAVENOUS EVERY 4 HOURS PRN
Status: CANCELLED | OUTPATIENT
Start: 2020-08-05

## 2020-08-05 RX ORDER — MORPHINE SULFATE 2 MG/ML
6 INJECTION, SOLUTION INTRAMUSCULAR; INTRAVENOUS EVERY 4 HOURS PRN
Status: CANCELLED | OUTPATIENT
Start: 2020-08-05 | End: 2020-08-15

## 2020-08-05 RX ORDER — MISOPROSTOL 200 UG/1
800 TABLET ORAL ONCE AS NEEDED
Status: CANCELLED | OUTPATIENT
Start: 2020-08-05 | End: 2020-08-06

## 2020-08-05 RX ORDER — SODIUM CHLORIDE 0.9 % (FLUSH) 0.9 %
1-10 SYRINGE (ML) INJECTION AS NEEDED
Status: CANCELLED | OUTPATIENT
Start: 2020-08-05

## 2020-08-05 RX ORDER — ONDANSETRON 2 MG/ML
4 INJECTION INTRAMUSCULAR; INTRAVENOUS ONCE AS NEEDED
Status: CANCELLED | OUTPATIENT
Start: 2020-08-05

## 2020-08-05 RX ORDER — SODIUM CHLORIDE 0.9 % (FLUSH) 0.9 %
3 SYRINGE (ML) INJECTION EVERY 12 HOURS SCHEDULED
Status: CANCELLED | OUTPATIENT
Start: 2020-08-05

## 2020-08-05 RX ORDER — MORPHINE SULFATE 4 MG/ML
4 INJECTION, SOLUTION INTRAMUSCULAR; INTRAVENOUS ONCE AS NEEDED
Status: CANCELLED | OUTPATIENT
Start: 2020-08-05

## 2020-08-05 RX ORDER — SODIUM CHLORIDE, SODIUM LACTATE, POTASSIUM CHLORIDE, CALCIUM CHLORIDE 600; 310; 30; 20 MG/100ML; MG/100ML; MG/100ML; MG/100ML
125 INJECTION, SOLUTION INTRAVENOUS CONTINUOUS
Status: CANCELLED | OUTPATIENT
Start: 2020-08-05

## 2020-08-05 RX ORDER — LIDOCAINE HYDROCHLORIDE 10 MG/ML
5 INJECTION, SOLUTION EPIDURAL; INFILTRATION; INTRACAUDAL; PERINEURAL AS NEEDED
Status: CANCELLED | OUTPATIENT
Start: 2020-08-05

## 2020-08-05 RX ORDER — OXYTOCIN/0.9 % SODIUM CHLORIDE 30/500 ML
650 PLASTIC BAG, INJECTION (ML) INTRAVENOUS ONCE
Status: CANCELLED | OUTPATIENT
Start: 2020-08-05 | End: 2020-08-05

## 2020-08-05 RX ORDER — HYDROCODONE BITARTRATE AND ACETAMINOPHEN 5; 325 MG/1; MG/1
2 TABLET ORAL ONCE AS NEEDED
Status: CANCELLED | OUTPATIENT
Start: 2020-08-05

## 2020-08-05 RX ORDER — ONDANSETRON 4 MG/1
4 TABLET, FILM COATED ORAL ONCE AS NEEDED
Status: CANCELLED | OUTPATIENT
Start: 2020-08-05

## 2020-08-05 RX ORDER — PROMETHAZINE HYDROCHLORIDE 25 MG/ML
25 INJECTION, SOLUTION INTRAMUSCULAR; INTRAVENOUS EVERY 4 HOURS PRN
Status: CANCELLED | OUTPATIENT
Start: 2020-08-05

## 2020-08-05 RX ORDER — MORPHINE SULFATE 2 MG/ML
2 INJECTION, SOLUTION INTRAMUSCULAR; INTRAVENOUS ONCE AS NEEDED
Status: CANCELLED | OUTPATIENT
Start: 2020-08-05

## 2020-08-06 PROBLEM — B95.1 POSITIVE TESTING FOR GROUP B STREPTOCOCCUS: Status: ACTIVE | Noted: 2020-07-24

## 2020-08-08 NOTE — PROGRESS NOTES
Chief Complaint   Patient presents with   • Routine Prenatal Visit     Patient complains of cramping and pressure.        HPI: Ines is a  currently at 38w6d who today reports the following:  Contractions - YES - but less than 4/hour AND no associated change in vaginal discharge; Leaking - No; Vaginal bleeding -  No; Heartburn - No.  The patient is without complaints other than irregular contractions.  Patient reports good fetal movement.  Patient denies any leaking of fluid or spotting.  Patient is desiring an induction of labor as soon as possible.  Patient reports she had to be induced with her previous pregnancies.  ROS:   GI:   Nausea - No; Constipation - No; Diarrhea - No.   Neuro:  Headache - No; Visual disturbances - No.    EXAM:   Vitals:  See prenatal flowsheet as noted and reviewed   General: Alert, cooperative, and does not appear in any distress   Lungs:  Respirations regular, even and unlabored   Abdomen:   See prenatal flowsheet as noted and reviewed     Uterus gravid, non-tender; no palpable masses     No guarding or rebound tenderness   Pelvic:  See prenatal flowsheet as noted and reviewed   Ext:  See prenatal flowsheet as noted and reviewed     Moves extremities well, no cyanosis and no redness   Skin:  No bleeding, bruising or rash and no lesions noted   Psych:  Normal mood and affect, oriented and thought content appropriate   Urine:  See prenatal flowsheet as noted and reviewed    Prenatal Labs  Lab Results   Component Value Date    HGB 11.0 (L) 2020    RUBELLAABIGG 1.30 2020    HEPBSAG Negative 2020    ABO O 2020    RH Negative 2020    ABSCRN Negative 2020    [id number] Non Reactive 2020    HEPCVIRUSABY <0.1 2020    STREPGPB Positive (A) 2020       MDM:  Impression: Supervision of low risk pregnancy  GBS+   Tests done today: none   Topics discussed: induction of labor -discussed the risk versus benefits of induction of labor.   Discussed with the patient the possibility of an increased risk of operative delivery given her unfavorable cervix.  Patient is GBS positive.  Patient understands membranes could not be stripped.  Patient desires to schedule an induction of labor as noted.  kick counts and fetal movement  labor signs and symptoms  PIH precautions   Tests next visit: none     This note was electronically signed.  Zuleika Sinclair M.D.

## 2020-08-10 ENCOUNTER — HOSPITAL ENCOUNTER (INPATIENT)
Facility: HOSPITAL | Age: 30
LOS: 2 days | Discharge: HOME | End: 2020-08-12
Attending: OBSTETRICS & GYNECOLOGY | Admitting: NURSE PRACTITIONER

## 2020-08-10 ENCOUNTER — HOSPITAL ENCOUNTER (OUTPATIENT)
Dept: LABOR AND DELIVERY | Facility: HOSPITAL | Age: 30
Discharge: HOME | End: 2020-08-10

## 2020-08-10 DIAGNOSIS — Z3A.39 39 WEEKS GESTATION OF PREGNANCY: ICD-10-CM

## 2020-08-10 PROBLEM — Z34.90 PREGNANCY: Status: ACTIVE | Noted: 2020-08-10

## 2020-08-10 LAB
ABO GROUP BLD: NORMAL
BASOPHILS # BLD AUTO: 0.03 10*3/MM3 (ref 0–0.2)
BASOPHILS NFR BLD AUTO: 0.3 % (ref 0–1.5)
BILIRUB BLD-MCNC: NEGATIVE MG/DL
BLD GP AB SCN SERPL QL: POSITIVE
CLARITY, POC: CLEAR
COLOR UR: YELLOW
DEPRECATED RDW RBC AUTO: 45.1 FL (ref 37–54)
EOSINOPHIL # BLD AUTO: 0.02 10*3/MM3 (ref 0–0.4)
EOSINOPHIL NFR BLD AUTO: 0.2 % (ref 0.3–6.2)
ERYTHROCYTE [DISTWIDTH] IN BLOOD BY AUTOMATED COUNT: 13.9 % (ref 12.3–15.4)
GLUCOSE UR STRIP-MCNC: NEGATIVE MG/DL
HCT VFR BLD AUTO: 32.4 % (ref 34–46.6)
HGB BLD-MCNC: 11.4 G/DL (ref 12–15.9)
IMM GRANULOCYTES # BLD AUTO: 0.11 10*3/MM3 (ref 0–0.05)
IMM GRANULOCYTES NFR BLD AUTO: 1.1 % (ref 0–0.5)
KETONES UR QL: NEGATIVE
LEUKOCYTE EST, POC: NEGATIVE
LYMPHOCYTES # BLD AUTO: 2 10*3/MM3 (ref 0.7–3.1)
LYMPHOCYTES NFR BLD AUTO: 19.3 % (ref 19.6–45.3)
MCH RBC QN AUTO: 31.7 PG (ref 26.6–33)
MCHC RBC AUTO-ENTMCNC: 35.2 G/DL (ref 31.5–35.7)
MCV RBC AUTO: 90 FL (ref 79–97)
MONOCYTES # BLD AUTO: 0.87 10*3/MM3 (ref 0.1–0.9)
MONOCYTES NFR BLD AUTO: 8.4 % (ref 5–12)
NEUTROPHILS NFR BLD AUTO: 7.33 10*3/MM3 (ref 1.7–7)
NEUTROPHILS NFR BLD AUTO: 70.7 % (ref 42.7–76)
NITRITE UR-MCNC: NEGATIVE MG/ML
NRBC BLD AUTO-RTO: 0 /100 WBC (ref 0–0.2)
PH UR: 6 [PH] (ref 5–8)
PLATELET # BLD AUTO: 159 10*3/MM3 (ref 140–450)
PMV BLD AUTO: 10.8 FL (ref 6–12)
PROT UR STRIP-MCNC: NEGATIVE MG/DL
RBC # BLD AUTO: 3.6 10*6/MM3 (ref 3.77–5.28)
RBC # UR STRIP: NEGATIVE /UL
RH BLD: NEGATIVE
SP GR UR: 1.01 (ref 1–1.03)
T&S EXPIRATION DATE: NORMAL
UROBILINOGEN UR QL: NORMAL
WBC # BLD AUTO: 10.36 10*3/MM3 (ref 3.4–10.8)

## 2020-08-10 PROCEDURE — 81002 URINALYSIS NONAUTO W/O SCOPE: CPT | Performed by: OBSTETRICS & GYNECOLOGY

## 2020-08-10 PROCEDURE — 25010000002 VANCOMYCIN 1 G RECONSTITUTED SOLUTION 1 EACH VIAL: Performed by: OBSTETRICS & GYNECOLOGY

## 2020-08-10 PROCEDURE — 86870 RBC ANTIBODY IDENTIFICATION: CPT | Performed by: OBSTETRICS & GYNECOLOGY

## 2020-08-10 PROCEDURE — 85025 COMPLETE CBC W/AUTO DIFF WBC: CPT | Performed by: OBSTETRICS & GYNECOLOGY

## 2020-08-10 PROCEDURE — 86850 RBC ANTIBODY SCREEN: CPT | Performed by: OBSTETRICS & GYNECOLOGY

## 2020-08-10 PROCEDURE — 86901 BLOOD TYPING SEROLOGIC RH(D): CPT | Performed by: OBSTETRICS & GYNECOLOGY

## 2020-08-10 PROCEDURE — 86922 COMPATIBILITY TEST ANTIGLOB: CPT

## 2020-08-10 PROCEDURE — 59025 FETAL NON-STRESS TEST: CPT

## 2020-08-10 PROCEDURE — 86921 COMPATIBILITY TEST INCUBATE: CPT

## 2020-08-10 PROCEDURE — 86920 COMPATIBILITY TEST SPIN: CPT

## 2020-08-10 PROCEDURE — 86900 BLOOD TYPING SEROLOGIC ABO: CPT | Performed by: OBSTETRICS & GYNECOLOGY

## 2020-08-10 RX ORDER — OXYTOCIN/0.9 % SODIUM CHLORIDE 30/500 ML
1-20 PLASTIC BAG, INJECTION (ML) INTRAVENOUS
Status: DISCONTINUED | OUTPATIENT
Start: 2020-08-10 | End: 2020-08-11 | Stop reason: HOSPADM

## 2020-08-10 RX ORDER — SODIUM CHLORIDE, SODIUM LACTATE, POTASSIUM CHLORIDE, CALCIUM CHLORIDE 600; 310; 30; 20 MG/100ML; MG/100ML; MG/100ML; MG/100ML
125 INJECTION, SOLUTION INTRAVENOUS CONTINUOUS
Status: DISCONTINUED | OUTPATIENT
Start: 2020-08-10 | End: 2020-08-11

## 2020-08-10 RX ORDER — LIDOCAINE HYDROCHLORIDE 10 MG/ML
5 INJECTION, SOLUTION EPIDURAL; INFILTRATION; INTRACAUDAL; PERINEURAL AS NEEDED
Status: DISCONTINUED | OUTPATIENT
Start: 2020-08-10 | End: 2020-08-11 | Stop reason: HOSPADM

## 2020-08-10 RX ORDER — PROMETHAZINE HYDROCHLORIDE 12.5 MG/1
12.5 SUPPOSITORY RECTAL EVERY 4 HOURS PRN
Status: DISCONTINUED | OUTPATIENT
Start: 2020-08-10 | End: 2020-08-11 | Stop reason: HOSPADM

## 2020-08-10 RX ORDER — MORPHINE SULFATE 4 MG/ML
4 INJECTION, SOLUTION INTRAMUSCULAR; INTRAVENOUS EVERY 4 HOURS PRN
Status: DISCONTINUED | OUTPATIENT
Start: 2020-08-10 | End: 2020-08-11 | Stop reason: HOSPADM

## 2020-08-10 RX ORDER — PROMETHAZINE HYDROCHLORIDE 25 MG/ML
25 INJECTION, SOLUTION INTRAMUSCULAR; INTRAVENOUS EVERY 4 HOURS PRN
Status: DISCONTINUED | OUTPATIENT
Start: 2020-08-10 | End: 2020-08-11 | Stop reason: HOSPADM

## 2020-08-10 RX ORDER — PROMETHAZINE HYDROCHLORIDE 12.5 MG/1
12.5 TABLET ORAL EVERY 4 HOURS PRN
Status: DISCONTINUED | OUTPATIENT
Start: 2020-08-10 | End: 2020-08-11 | Stop reason: HOSPADM

## 2020-08-10 RX ORDER — SODIUM CHLORIDE 0.9 % (FLUSH) 0.9 %
1-10 SYRINGE (ML) INJECTION AS NEEDED
Status: DISCONTINUED | OUTPATIENT
Start: 2020-08-10 | End: 2020-08-11 | Stop reason: HOSPADM

## 2020-08-10 RX ORDER — SODIUM CHLORIDE 0.9 % (FLUSH) 0.9 %
3 SYRINGE (ML) INJECTION EVERY 12 HOURS SCHEDULED
Status: DISCONTINUED | OUTPATIENT
Start: 2020-08-10 | End: 2020-08-11 | Stop reason: HOSPADM

## 2020-08-10 RX ORDER — PROMETHAZINE HYDROCHLORIDE 25 MG/ML
12.5 INJECTION, SOLUTION INTRAMUSCULAR; INTRAVENOUS EVERY 4 HOURS PRN
Status: DISCONTINUED | OUTPATIENT
Start: 2020-08-10 | End: 2020-08-11 | Stop reason: HOSPADM

## 2020-08-10 RX ORDER — MORPHINE SULFATE 2 MG/ML
6 INJECTION, SOLUTION INTRAMUSCULAR; INTRAVENOUS EVERY 4 HOURS PRN
Status: DISCONTINUED | OUTPATIENT
Start: 2020-08-10 | End: 2020-08-11 | Stop reason: HOSPADM

## 2020-08-10 RX ADMIN — VANCOMYCIN HYDROCHLORIDE 1 G: 1 INJECTION, POWDER, LYOPHILIZED, FOR SOLUTION INTRAVENOUS at 22:32

## 2020-08-10 RX ADMIN — Medication 1 MILLI-UNITS/MIN: at 21:59

## 2020-08-10 RX ADMIN — SODIUM CHLORIDE, POTASSIUM CHLORIDE, SODIUM LACTATE AND CALCIUM CHLORIDE 125 ML/HR: 600; 310; 30; 20 INJECTION, SOLUTION INTRAVENOUS at 21:54

## 2020-08-11 ENCOUNTER — ANESTHESIA (OUTPATIENT)
Dept: LABOR AND DELIVERY | Facility: HOSPITAL | Age: 30
End: 2020-08-11

## 2020-08-11 ENCOUNTER — ANESTHESIA EVENT (OUTPATIENT)
Dept: LABOR AND DELIVERY | Facility: HOSPITAL | Age: 30
End: 2020-08-11

## 2020-08-11 PROCEDURE — 25010000002 FENTANYL CITRATE (PF) 250 MCG/5ML SOLUTION 5 ML VIAL: Performed by: NURSE ANESTHETIST, CERTIFIED REGISTERED

## 2020-08-11 PROCEDURE — C1755 CATHETER, INTRASPINAL: HCPCS | Performed by: NURSE ANESTHETIST, CERTIFIED REGISTERED

## 2020-08-11 PROCEDURE — 59410 OBSTETRICAL CARE: CPT | Performed by: NURSE PRACTITIONER

## 2020-08-11 PROCEDURE — 51701 INSERT BLADDER CATHETER: CPT

## 2020-08-11 PROCEDURE — 25010000002 MORPHINE PER 10 MG: Performed by: OBSTETRICS & GYNECOLOGY

## 2020-08-11 PROCEDURE — 25010000002 FENTANYL CITRATE (PF) 100 MCG/2ML SOLUTION: Performed by: NURSE ANESTHETIST, CERTIFIED REGISTERED

## 2020-08-11 PROCEDURE — 59025 FETAL NON-STRESS TEST: CPT | Performed by: OBSTETRICS & GYNECOLOGY

## 2020-08-11 PROCEDURE — 0KQM0ZZ REPAIR PERINEUM MUSCLE, OPEN APPROACH: ICD-10-PCS | Performed by: NURSE PRACTITIONER

## 2020-08-11 PROCEDURE — 4A1HX4Z MONITORING OF PRODUCTS OF CONCEPTION, CARDIAC ELECTRICAL ACTIVITY, EXTERNAL APPROACH: ICD-10-PCS | Performed by: NURSE PRACTITIONER

## 2020-08-11 PROCEDURE — 51703 INSERT BLADDER CATH COMPLEX: CPT

## 2020-08-11 PROCEDURE — 25010000002 VANCOMYCIN 1 G RECONSTITUTED SOLUTION 1 EACH VIAL: Performed by: OBSTETRICS & GYNECOLOGY

## 2020-08-11 PROCEDURE — 25010000002 ROPIVACAINE PER 1 MG: Performed by: NURSE ANESTHETIST, CERTIFIED REGISTERED

## 2020-08-11 RX ORDER — PROMETHAZINE HYDROCHLORIDE 25 MG/ML
12.5 INJECTION, SOLUTION INTRAMUSCULAR; INTRAVENOUS EVERY 6 HOURS PRN
Status: DISCONTINUED | OUTPATIENT
Start: 2020-08-11 | End: 2020-08-12 | Stop reason: HOSPADM

## 2020-08-11 RX ORDER — PROMETHAZINE HYDROCHLORIDE 25 MG/1
25 TABLET ORAL EVERY 6 HOURS PRN
Status: DISCONTINUED | OUTPATIENT
Start: 2020-08-11 | End: 2020-08-12 | Stop reason: HOSPADM

## 2020-08-11 RX ORDER — ONDANSETRON 2 MG/ML
4 INJECTION INTRAMUSCULAR; INTRAVENOUS EVERY 6 HOURS PRN
Status: DISCONTINUED | OUTPATIENT
Start: 2020-08-11 | End: 2020-08-12 | Stop reason: HOSPADM

## 2020-08-11 RX ORDER — DIPHENHYDRAMINE HCL 25 MG
25 CAPSULE ORAL NIGHTLY PRN
Status: DISCONTINUED | OUTPATIENT
Start: 2020-08-11 | End: 2020-08-12 | Stop reason: HOSPADM

## 2020-08-11 RX ORDER — IBUPROFEN 600 MG/1
600 TABLET ORAL EVERY 6 HOURS PRN
Status: DISCONTINUED | OUTPATIENT
Start: 2020-08-11 | End: 2020-08-12 | Stop reason: HOSPADM

## 2020-08-11 RX ORDER — ACETAMINOPHEN 325 MG/1
650 TABLET ORAL ONCE AS NEEDED
Status: DISCONTINUED | OUTPATIENT
Start: 2020-08-11 | End: 2020-08-11 | Stop reason: HOSPADM

## 2020-08-11 RX ORDER — TRISODIUM CITRATE DIHYDRATE AND CITRIC ACID MONOHYDRATE 500; 334 MG/5ML; MG/5ML
30 SOLUTION ORAL ONCE
Status: DISCONTINUED | OUTPATIENT
Start: 2020-08-11 | End: 2020-08-11 | Stop reason: HOSPADM

## 2020-08-11 RX ORDER — ONDANSETRON 4 MG/1
4 TABLET, FILM COATED ORAL EVERY 8 HOURS PRN
Status: DISCONTINUED | OUTPATIENT
Start: 2020-08-11 | End: 2020-08-12 | Stop reason: HOSPADM

## 2020-08-11 RX ORDER — FENTANYL CITRATE 50 UG/ML
INJECTION, SOLUTION INTRAMUSCULAR; INTRAVENOUS
Status: COMPLETED
Start: 2020-08-11 | End: 2020-08-11

## 2020-08-11 RX ORDER — OXYTOCIN/0.9 % SODIUM CHLORIDE 30/500 ML
85 PLASTIC BAG, INJECTION (ML) INTRAVENOUS ONCE
Status: COMPLETED | OUTPATIENT
Start: 2020-08-11 | End: 2020-08-11

## 2020-08-11 RX ORDER — MISOPROSTOL 200 UG/1
800 TABLET ORAL ONCE AS NEEDED
Status: DISCONTINUED | OUTPATIENT
Start: 2020-08-11 | End: 2020-08-11 | Stop reason: HOSPADM

## 2020-08-11 RX ORDER — PRENATAL VIT/IRON FUM/FOLIC AC 27MG-0.8MG
1 TABLET ORAL DAILY
Status: DISCONTINUED | OUTPATIENT
Start: 2020-08-11 | End: 2020-08-12 | Stop reason: HOSPADM

## 2020-08-11 RX ORDER — HYDROCODONE BITARTRATE AND ACETAMINOPHEN 5; 325 MG/1; MG/1
1 TABLET ORAL EVERY 4 HOURS PRN
Status: DISCONTINUED | OUTPATIENT
Start: 2020-08-11 | End: 2020-08-12 | Stop reason: HOSPADM

## 2020-08-11 RX ORDER — HYDROCODONE BITARTRATE AND ACETAMINOPHEN 7.5; 325 MG/1; MG/1
1 TABLET ORAL EVERY 4 HOURS PRN
Status: DISCONTINUED | OUTPATIENT
Start: 2020-08-11 | End: 2020-08-12 | Stop reason: HOSPADM

## 2020-08-11 RX ORDER — ONDANSETRON 2 MG/ML
4 INJECTION INTRAMUSCULAR; INTRAVENOUS ONCE AS NEEDED
Status: DISCONTINUED | OUTPATIENT
Start: 2020-08-11 | End: 2020-08-11 | Stop reason: HOSPADM

## 2020-08-11 RX ORDER — MAGNESIUM CARB/ALUMINUM HYDROX 105-160MG
30 TABLET,CHEWABLE ORAL DAILY PRN
Status: DISCONTINUED | OUTPATIENT
Start: 2020-08-11 | End: 2020-08-11

## 2020-08-11 RX ORDER — SODIUM CHLORIDE 0.9 % (FLUSH) 0.9 %
1-10 SYRINGE (ML) INJECTION AS NEEDED
Status: DISCONTINUED | OUTPATIENT
Start: 2020-08-11 | End: 2020-08-12 | Stop reason: HOSPADM

## 2020-08-11 RX ORDER — CARBOPROST TROMETHAMINE 250 UG/ML
250 INJECTION, SOLUTION INTRAMUSCULAR ONCE AS NEEDED
Status: DISCONTINUED | OUTPATIENT
Start: 2020-08-11 | End: 2020-08-11 | Stop reason: HOSPADM

## 2020-08-11 RX ORDER — ONDANSETRON 4 MG/1
4 TABLET, FILM COATED ORAL ONCE AS NEEDED
Status: DISCONTINUED | OUTPATIENT
Start: 2020-08-11 | End: 2020-08-11 | Stop reason: HOSPADM

## 2020-08-11 RX ORDER — MORPHINE SULFATE 4 MG/ML
4 INJECTION, SOLUTION INTRAMUSCULAR; INTRAVENOUS ONCE AS NEEDED
Status: COMPLETED | OUTPATIENT
Start: 2020-08-11 | End: 2020-08-11

## 2020-08-11 RX ORDER — MORPHINE SULFATE 2 MG/ML
2 INJECTION, SOLUTION INTRAMUSCULAR; INTRAVENOUS ONCE AS NEEDED
Status: DISCONTINUED | OUTPATIENT
Start: 2020-08-11 | End: 2020-08-11 | Stop reason: HOSPADM

## 2020-08-11 RX ORDER — FENTANYL CITRATE 50 UG/ML
INJECTION, SOLUTION INTRAMUSCULAR; INTRAVENOUS AS NEEDED
Status: DISCONTINUED | OUTPATIENT
Start: 2020-08-11 | End: 2020-08-11 | Stop reason: SURG

## 2020-08-11 RX ORDER — BISACODYL 10 MG
10 SUPPOSITORY, RECTAL RECTAL DAILY PRN
Status: DISCONTINUED | OUTPATIENT
Start: 2020-08-12 | End: 2020-08-12 | Stop reason: HOSPADM

## 2020-08-11 RX ORDER — DOCUSATE SODIUM 100 MG/1
100 CAPSULE, LIQUID FILLED ORAL 2 TIMES DAILY
Status: DISCONTINUED | OUTPATIENT
Start: 2020-08-11 | End: 2020-08-12 | Stop reason: HOSPADM

## 2020-08-11 RX ORDER — PRENATAL VIT/IRON FUM/FOLIC AC 27MG-0.8MG
1 TABLET ORAL DAILY
Status: DISCONTINUED | OUTPATIENT
Start: 2020-08-11 | End: 2020-08-11

## 2020-08-11 RX ORDER — HYDROCODONE BITARTRATE AND ACETAMINOPHEN 5; 325 MG/1; MG/1
2 TABLET ORAL ONCE AS NEEDED
Status: DISCONTINUED | OUTPATIENT
Start: 2020-08-11 | End: 2020-08-11 | Stop reason: HOSPADM

## 2020-08-11 RX ORDER — METHYLERGONOVINE MALEATE 0.2 MG/ML
200 INJECTION INTRAVENOUS ONCE AS NEEDED
Status: DISCONTINUED | OUTPATIENT
Start: 2020-08-11 | End: 2020-08-11 | Stop reason: HOSPADM

## 2020-08-11 RX ORDER — OXYTOCIN/0.9 % SODIUM CHLORIDE 30/500 ML
2 PLASTIC BAG, INJECTION (ML) INTRAVENOUS
Status: DISCONTINUED | OUTPATIENT
Start: 2020-08-11 | End: 2020-08-11

## 2020-08-11 RX ORDER — EPHEDRINE SULFATE/0.9% NACL/PF 25 MG/5 ML
5 SYRINGE (ML) INTRAVENOUS
Status: DISCONTINUED | OUTPATIENT
Start: 2020-08-11 | End: 2020-08-11 | Stop reason: HOSPADM

## 2020-08-11 RX ORDER — OXYTOCIN/0.9 % SODIUM CHLORIDE 30/500 ML
650 PLASTIC BAG, INJECTION (ML) INTRAVENOUS ONCE
Status: COMPLETED | OUTPATIENT
Start: 2020-08-11 | End: 2020-08-11

## 2020-08-11 RX ORDER — LANOLIN
CREAM (GRAM) TOPICAL
Status: DISCONTINUED | OUTPATIENT
Start: 2020-08-11 | End: 2020-08-12 | Stop reason: HOSPADM

## 2020-08-11 RX ORDER — HYDROCORTISONE 25 MG/G
1 CREAM TOPICAL AS NEEDED
Status: DISCONTINUED | OUTPATIENT
Start: 2020-08-11 | End: 2020-08-12 | Stop reason: HOSPADM

## 2020-08-11 RX ORDER — PROMETHAZINE HYDROCHLORIDE 12.5 MG/1
12.5 SUPPOSITORY RECTAL EVERY 6 HOURS PRN
Status: DISCONTINUED | OUTPATIENT
Start: 2020-08-11 | End: 2020-08-12 | Stop reason: HOSPADM

## 2020-08-11 RX ADMIN — VANCOMYCIN HYDROCHLORIDE 1 G: 1 INJECTION, POWDER, LYOPHILIZED, FOR SOLUTION INTRAVENOUS at 10:06

## 2020-08-11 RX ADMIN — MORPHINE SULFATE 4 MG: 4 INJECTION, SOLUTION INTRAMUSCULAR; INTRAVENOUS at 12:49

## 2020-08-11 RX ADMIN — SODIUM CHLORIDE, POTASSIUM CHLORIDE, SODIUM LACTATE AND CALCIUM CHLORIDE 125 ML/HR: 600; 310; 30; 20 INJECTION, SOLUTION INTRAVENOUS at 12:47

## 2020-08-11 RX ADMIN — Medication 85 ML/HR: at 16:33

## 2020-08-11 RX ADMIN — Medication 650 ML/HR: at 16:05

## 2020-08-11 RX ADMIN — MINERAL OIL 30 ML: 1000 SOLUTION ORAL at 15:32

## 2020-08-11 RX ADMIN — DOCUSATE SODIUM 100 MG: 100 CAPSULE, LIQUID FILLED ORAL at 21:29

## 2020-08-11 RX ADMIN — ROPIVACAINE HYDROCHLORIDE 14 ML/HR: 2 INJECTION, SOLUTION EPIDURAL; INFILTRATION at 13:55

## 2020-08-11 RX ADMIN — FENTANYL CITRATE 100 MCG: 50 INJECTION, SOLUTION INTRAMUSCULAR; INTRAVENOUS at 14:05

## 2020-08-11 RX ADMIN — SODIUM CHLORIDE, POTASSIUM CHLORIDE, SODIUM LACTATE AND CALCIUM CHLORIDE 125 ML/HR: 600; 310; 30; 20 INJECTION, SOLUTION INTRAVENOUS at 07:25

## 2020-08-11 RX ADMIN — PRENATAL VITAMINS-IRON FUMARATE 27 MG IRON-FOLIC ACID 0.8 MG TABLET 1 TABLET: at 22:22

## 2020-08-11 RX ADMIN — SODIUM CHLORIDE, POTASSIUM CHLORIDE, SODIUM LACTATE AND CALCIUM CHLORIDE 1000 ML: 600; 310; 30; 20 INJECTION, SOLUTION INTRAVENOUS at 13:55

## 2020-08-11 RX ADMIN — SERTRALINE HYDROCHLORIDE 50 MG: 50 TABLET ORAL at 22:22

## 2020-08-11 RX ADMIN — BENZOCAINE AND LEVOMENTHOL 1 APPLICATION: 200; 5 SPRAY TOPICAL at 21:25

## 2020-08-11 NOTE — NON STRESS TEST
Inse Huntley, a  at 39w2d with an MATHTEW of 2020, by Ultrasound, was seen at [facility] for a nonstress test.    Chief Complaint   Patient presents with   • Scheduled Induction     Elective       Patient Active Problem List   Diagnosis   • History of kidney stones   • Rh negative status during pregnancy   • Positive testing for group B Streptococcus   • Pregnancy       Start Time:   Stop Time:         Reactive NST. Ready for scheduled induction

## 2020-08-11 NOTE — NURSING NOTE
Report was received from Nae Flower RN at the bedside. Patient was ready to be moved to the floor. I then report to Stephie Champagne RN. No Assessment was done by this RN.

## 2020-08-11 NOTE — PLAN OF CARE
Problem: Patient Care Overview  Goal: Plan of Care Review  Outcome: Ongoing (interventions implemented as appropriate)  Flowsheets (Taken 8/10/2020 2149)  Progress: no change  Plan of Care Reviewed With: patient; spouse  Outcome Summary: begining induction  Note:   Plan of care reviewed with pt and FOB, question answered and information given

## 2020-08-11 NOTE — NURSING NOTE
This RN called and spoke with Jim KITCHEN in regards to VE, update given; APRN to come to bedside.

## 2020-08-11 NOTE — H&P
Eddie  Ines Huntley  : 1990  MRN: [id number]  CSN: [id number]    Chief Complain:  Induction of labor    History and Physical    Ines Huntley is a 30 y.o. year old  with an Estimated Date of Delivery: 20 currently at 39w2d presenting for induction of labor for  elective at term per patient request.    She arrived last night for slow drip pitocin - this AM she reports she was not feeling contractions -  She has no c/o and plans for epidural.  States once membranes are ruptured she delivers quickly.     Risks of labor induction including prolongation of labor, increased risks for both  section and operative vaginal birth have been discussed at length in the office. She desires to proceed with induction.    Prenatal care has been with [facility].    Prenatal course has been uncomplicated.    Previous deliveries reported to be uncomplicated    Total wt gain 23 pounds   Total visits 10    OB History    Para Term  AB Living   6 3 3 0 2 3   SAB TAB Ectopic Molar Multiple Live Births   2 0 0 0 0 3      # Outcome Date GA Lbr Aime/2nd Weight Sex Delivery Anes PTL Lv   6 Current            5 SAB 2018           4 Term 14 40w0d  3459 g (7 lb 10 oz) F Vag-Spont EPI  HARSHAD      Complications: Kidney stones   3 Term 11 39w0d  3232 g (7 lb 2 oz) F Vag-Spont EPI  HARSHAD      Complications: Kidney stones   2 Term 08 40w0d  3884 g (8 lb 9 oz) M Vag-Spont EPI  HARSHAD      Complications: Kidney stones   1 SAB              Past Medical History:   Diagnosis Date   • Kidney stones    • Positive testing for group B Streptococcus 2020   • Rh negative status during pregnancy 2018   • Wears contact lenses      Past Surgical History:   Procedure Laterality Date   • COLONOSCOPY     • D&C WITH SUCTION N/A 2019    Procedure: DILATATION AND CURETTAGE WITH SUCTION;  Surgeon: Aleksey Danielle MD;  Location: [facility];  Service: Obstetrics/Gynecology   •  "DILATATION AND CURETTAGE         Review of Systems        Pertinent items are noted in HPI, all other systems reviewed and negative  Allergies   Allergen Reactions   • Amoxicillin Hives     Social History    Tobacco Use      Smoking status: Never Smoker      Smokeless tobacco: Never Used      BP 95/55 (BP Location: Left arm, Patient Position: Lying)   Pulse 82   Temp 98.9 °F (37.2 °C) (Oral)   Resp 16   Ht 162 cm (63.78\")   Wt 73 kg (161 lb)   LMP 11/10/2019 (Approximate)   SpO2 100%   Breastfeeding Yes   BMI 27.83 kg/m²     Physical Exam       Psych: Altert and oriented to time, place and person  Mood and affect appropriate   General: well developed; well nourished  no acute distress  Heart: regular rate and rhythm, no murmur  Lungs:  breathing is unlabored  clear to auscultation bilaterally  Abdomen: Gravid - soft and non-tender EFW 8 pounds  ctx's this AM irregular - pit at 10 mu  FHT's 135 + accels and variability  Lower Extremities: Neg edema and DTR's 2+   Skin: warm and dry  V/E: tight 2/50% /-1  Cephalic                   Prenatal Labs  Lab Results   Component Value Date    HGB 11.4 (L) 08/10/2020    HEPBSAG Negative 2020    ABSCRN Positive 08/10/2020    [id number] Non Reactive 2020    HEPCVIRUSABY <0.1 2020    STREPGPB Positive (A) 2020       Current Labs Reviewed   CBC    Assessment:  1. UP with an Estimated Date of Delivery: 20  2. Induction of labor because of elective at term per patient request  3. Group B strep status: positive  4. FHT's reassuring     Plan:    1.  Admit to  with Pitocin drip - RSO - will increase pitocin every 15 min until regular contractions  - GBS antibiotics  2.  Pain meds / Epidural optional   3.  Amniotomy with descent of presenting part  4.  Anticipate    5.  Ross in 1-2 hrs    0945  Feeling contractions   Ctx's every 2-3 min - pit at 14 mu  FHTs 145 + accels and variability  V/E 2/60%-1 well applied AROM small clear fluid "   Plan  Anticipate    Encouraged questions and answered         Sienna Garcia CNM  2020  09:55

## 2020-08-11 NOTE — ANESTHESIA PREPROCEDURE EVALUATION
Anesthesia Evaluation     Patient summary reviewed and Nursing notes reviewed   NPO Solid Status: > 8 hours  NPO Liquid Status: > 8 hours           Airway   Mallampati: II  TM distance: >3 FB  Neck ROM: full  No difficulty expected  Dental      Pulmonary    Cardiovascular   Exercise tolerance: good (4-7 METS)    Rhythm: regular  Rate: normal        Neuro/Psych  GI/Hepatic/Renal/Endo    (+)   renal disease stones,     Musculoskeletal     Abdominal    Substance History      OB/GYN    (-)  Pregnant        Other        ROS/Med Hx Other: plt 159                    Anesthesia Plan    ASA 2 - emergent     spinal   (Risks discussed , including but not limited to:  Headache, itching, n&v, infection, failure, decreased blood pressure, permanent chronic/back pain, nerve damage, paralysis, etc. All questions answered and informed consent obtained. )  intravenous induction     Anesthetic plan, all risks, benefits, and alternatives have been provided, discussed and informed consent has been obtained with: patient.    Plan discussed with CRNA.

## 2020-08-11 NOTE — PROGRESS NOTES
1220  States contractions increasing in quality  VSS  Ctx's every 2-3 min Pit at 22 mu  FHT's 125 + accels and variability  V/E deferred for now  Plan.  Encouraged peanut ball - agrees and placed left lateral   Will do V/E in 15 min   Option for epidural.   Encourage questions and answered    1245   V/E 2+/75%/-1 continues to leak clear fluid  Plan:  Option for pain meds or epidural   Continue to increase Pitocin up to 30 mu if needed

## 2020-08-11 NOTE — L&D DELIVERY NOTE
[facility]  Vaginal Delivery Note    Delivery     Delivery: Vaginal, Spontaneous     YOB: 2022    Time of Birth:  Gestational Age 3:59 PM   39w2d     Anesthesia: Epidural     Delivering clinician: Sienna Garcia CNM   Forceps?   No   Vacuum? No    Shoulder dystocia present: No        Delivery narrative:      of viable male over an intact perineum - loose nuchal cord and reduced with delivery of infant bulb sx'd and to moms abdomen - & dried - delayed cord clamping x 30 sec - double clamped and cut per FOB.  Apgars 7&9.   Spontaneous delivery of placenta - intact - 20 units pitocin to IVF's - F/F with message.  Vaginal inspection - very small second degree sutured with chrooo CT.   cc/         Infant    Findings: male  infant     Infant observations: Weight: 3118 g (6 lb 14 oz)   Length: 18  in  Observations/Comments:         Apgars:   7 @ 1 minute /      9@ 5 minutes   Infant Name: Kalia     Placenta, Cord, and Fluid    Placenta delivered  Spontaneous  at   2020  4:05 PM     Cord: 3 vessels  present.   Nuchal Cord?  yes; Number of nuchal loops present:    1   Cord blood obtained: Yes    Cord gases obtained:  No    Cord gas results: Venous:  No results found for: PHCVEN    Arterial:  No results found for: PHCART     Repair    Episiotomy: No        Lacerations: See note above    Estimated Blood Loss: Est. Blood Loss (mL): 200 mL(Filed from Delivery Summary) (20 0851)           Complications  none    Disposition  Mother to Mother Baby/Postpartum  in stable condition currently.  Baby to remains with mom  in stable condition currently.      Sienna Garcia CNM  20  17:05

## 2020-08-11 NOTE — ANESTHESIA PROCEDURE NOTES
Labor Epidural      Patient reassessed immediately prior to procedure    Patient location during procedure: ICU  Indication:at surgeon's request  Performed By  CRNA: Aleksey Bridges CRNA  Preanesthetic Checklist  Completed: patient identified, site marked, surgical consent, pre-op evaluation, timeout performed, IV checked, risks and benefits discussed and monitors and equipment checked  Additional Notes  Risks discussed , including but not limited to:  Headache, itching, n&v, infection, failure, decreased bp, decreased fetal hr, possible c/s. Permanent chronic back pain, nerve damage, paralysis, etc.  Placed with moderate difficulty d/t pt movement d/t contractions and tight interspaces. Placed on epid gtt via pump locked no port. Tolerated epid without adverse rx  Prep:  Pt Position:sitting  Sterile Tech:cap, gloves, gown, mask and sterile barrier  Prep:chlorhexidine gluconate and isopropyl alcohol  Monitoring:blood pressure monitoring and continuous pulse oximetry  Epidural Block Procedure:  Approach:midline  Guidance:landmark technique and palpation technique  Location:L3-L4  Needle Type:Tuohy  Needle Gauge:18 G  Loss of Resistance Medium: saline  Loss of Resistance: 5cm  Cath Depth at skin:10 cm  Paresthesia: none  Aspiration:negative  Test Dose:negative  Post Assessment:  Dressing:occlusive dressing applied, secured with tape and biopatch applied  Pt Tolerance:patient tolerated the procedure well with no apparent complications  Complications:no

## 2020-08-11 NOTE — ANESTHESIA POSTPROCEDURE EVALUATION
Patient: Ines Huntley    Procedure Summary     Date:  08/11/20 Room / Location:      Anesthesia Start:  1331 Anesthesia Stop:  1613    Procedure:  LABOR ANALGESIA Diagnosis:      Scheduled Providers:   Provider:  Aleksey Bridges CRNA    Anesthesia Type:  epidural ASA Status:  2 - Emergent          Anesthesia Type: epidural    Vitals  Vitals Value Taken Time   /51 8/11/2020  4:45 PM   Temp 98.1 °F (36.7 °C) 8/11/2020  2:59 PM   Pulse 78 8/11/2020  4:45 PM   Resp     SpO2 88 % 8/11/2020  3:49 PM           Post Anesthesia Care and Evaluation    Patient location during evaluation: PACU  Patient participation: complete - patient participated  Level of consciousness: awake  Pain score: 1  Pain management: adequate  Airway patency: patent  Anesthetic complications: No anesthetic complications  PONV Status: controlled  Cardiovascular status: acceptable and stable  Respiratory status: acceptable  Hydration status: acceptable

## 2020-08-12 VITALS
HEART RATE: 78 BPM | OXYGEN SATURATION: 98 % | RESPIRATION RATE: 18 BRPM | BODY MASS INDEX: 27.49 KG/M2 | DIASTOLIC BLOOD PRESSURE: 59 MMHG | WEIGHT: 161 LBS | TEMPERATURE: 98.8 F | HEIGHT: 64 IN | SYSTOLIC BLOOD PRESSURE: 107 MMHG

## 2020-08-12 LAB
BASOPHILS # BLD AUTO: 0.06 10*3/MM3 (ref 0–0.2)
BASOPHILS NFR BLD AUTO: 0.6 % (ref 0–1.5)
DEPRECATED RDW RBC AUTO: 48 FL (ref 37–54)
EOSINOPHIL # BLD AUTO: 0.04 10*3/MM3 (ref 0–0.4)
EOSINOPHIL NFR BLD AUTO: 0.4 % (ref 0.3–6.2)
ERYTHROCYTE [DISTWIDTH] IN BLOOD BY AUTOMATED COUNT: 14.2 % (ref 12.3–15.4)
HCT VFR BLD AUTO: 33.5 % (ref 34–46.6)
HGB BLD-MCNC: 11.2 G/DL (ref 12–15.9)
IMM GRANULOCYTES # BLD AUTO: 0.1 10*3/MM3 (ref 0–0.05)
IMM GRANULOCYTES NFR BLD AUTO: 0.9 % (ref 0–0.5)
LYMPHOCYTES # BLD AUTO: 2.03 10*3/MM3 (ref 0.7–3.1)
LYMPHOCYTES NFR BLD AUTO: 18.9 % (ref 19.6–45.3)
MCH RBC QN AUTO: 30.9 PG (ref 26.6–33)
MCHC RBC AUTO-ENTMCNC: 33.4 G/DL (ref 31.5–35.7)
MCV RBC AUTO: 92.3 FL (ref 79–97)
MONOCYTES # BLD AUTO: 0.95 10*3/MM3 (ref 0.1–0.9)
MONOCYTES NFR BLD AUTO: 8.8 % (ref 5–12)
NEUTROPHILS NFR BLD AUTO: 7.58 10*3/MM3 (ref 1.7–7)
NEUTROPHILS NFR BLD AUTO: 70.4 % (ref 42.7–76)
NRBC BLD AUTO-RTO: 0 /100 WBC (ref 0–0.2)
PLATELET # BLD AUTO: 151 10*3/MM3 (ref 140–450)
PMV BLD AUTO: 11.9 FL (ref 6–12)
RBC # BLD AUTO: 3.63 10*6/MM3 (ref 3.77–5.28)
WBC # BLD AUTO: 10.76 10*3/MM3 (ref 3.4–10.8)

## 2020-08-12 PROCEDURE — 99024 POSTOP FOLLOW-UP VISIT: CPT | Performed by: PHYSICIAN ASSISTANT

## 2020-08-12 PROCEDURE — 99024 POSTOP FOLLOW-UP VISIT: CPT | Performed by: OBSTETRICS & GYNECOLOGY

## 2020-08-12 PROCEDURE — 85025 COMPLETE CBC W/AUTO DIFF WBC: CPT | Performed by: NURSE PRACTITIONER

## 2020-08-12 RX ORDER — DOCUSATE SODIUM 100 MG/1
100 CAPSULE, LIQUID FILLED ORAL 2 TIMES DAILY
Qty: 60 CAPSULE | Refills: 0 | Status: SHIPPED | OUTPATIENT
Start: 2020-08-12 | End: 2023-02-10

## 2020-08-12 RX ORDER — HYDROCODONE BITARTRATE AND ACETAMINOPHEN 5; 325 MG/1; MG/1
1 TABLET ORAL EVERY 6 HOURS PRN
Qty: 10 TABLET | Refills: 0 | Status: SHIPPED | OUTPATIENT
Start: 2020-08-12 | End: 2023-02-10

## 2020-08-12 RX ADMIN — HYDROCODONE BITARTRATE AND ACETAMINOPHEN 1 TABLET: 7.5; 325 TABLET ORAL at 09:19

## 2020-08-12 RX ADMIN — IBUPROFEN 600 MG: 600 TABLET ORAL at 00:28

## 2020-08-12 RX ADMIN — DOCUSATE SODIUM 100 MG: 100 CAPSULE, LIQUID FILLED ORAL at 09:19

## 2020-08-12 RX ADMIN — IBUPROFEN 600 MG: 600 TABLET ORAL at 06:26

## 2020-08-12 RX ADMIN — SERTRALINE HYDROCHLORIDE 75 MG: 50 TABLET ORAL at 09:19

## 2020-08-12 RX ADMIN — IBUPROFEN 600 MG: 600 TABLET ORAL at 12:22

## 2020-08-12 RX ADMIN — HYDROCODONE BITARTRATE AND ACETAMINOPHEN 1 TABLET: 7.5; 325 TABLET ORAL at 01:44

## 2020-08-12 RX ADMIN — PRENATAL VITAMINS-IRON FUMARATE 27 MG IRON-FOLIC ACID 0.8 MG TABLET 1 TABLET: at 09:19

## 2020-08-12 NOTE — PAYOR COMM NOTE
"TO:[facility]  FROM:ALEX WANG, CARMEN PHONE [phone]  INPT NOTIFICATION/CLINICASL     EDC 20@39 2/7 WEEKS DELIVERED @1359  MALE 6#14OZ APG     Ines Joshua (30 y.o. Female)     Date of Birth Social Security Number Address Home Phone MRN    1990   [address] [address] [phone] [id number]    Congregational Marital Status          None        Admission Date Admission Type Admitting Provider Attending Provider Department, Room/Bed    8/10/20 Elective Zuleika Sinclair MD Hol[place], Ian Blake, MD [facility],     Discharge Date Discharge Disposition Discharge Destination                       Attending Provider:  Rishi Torres MD    Allergies:  Amoxicillin    Isolation:  None   Infection:  None   Code Status:  CPR    Ht:  162 cm (63.78\")   Wt:  73 kg (161 lb)    Admission Cmt:  None   Principal Problem:  None                Active Insurance as of 8/10/2020     Primary Coverage     Payor Plan Insurance Group Employer/Plan Group    [facility] MEDICAID M#[id number]     Payor Plan Address Payor Plan Phone Number Payor Plan Fax Number Effective Dates    [address] [address] [phone]  2020 - None Entered    [facility] [id number]       Subscriber Name Subscriber Birth Date Member ID       INES JOSHUA 1990 [id number]                 Emergency Contacts      (Rel.) Home Phone Work Phone Mobile Phone    diazadam (Mother) [phone] -- --    Cesar Joshua (Spouse) [phone] -- --               History & Physical      Sienna Garcia CNM at 20 0953     Attestation signed by Aleksey Danielle MD at 20 1019    Agree                   [facility]  Ines Joshua  : 1990  MRN: [id number]  CSN: [id number]    Chief Complain:  Induction of labor    History and Physical    Ines Joshua is a 30 y.o. year old  with an Estimated Date of Delivery: 20 currently at 39w2d " presenting for induction of labor for  elective at term per patient request.    She arrived last night for slow drip pitocin - this AM she reports she was not feeling contractions -  She has no c/o and plans for epidural.  States once membranes are ruptured she delivers quickly.     Risks of labor induction including prolongation of labor, increased risks for both  section and operative vaginal birth have been discussed at length in the office. She desires to proceed with induction.    Prenatal care has been with [facility].    Prenatal course has been uncomplicated.    Previous deliveries reported to be uncomplicated    Total wt gain 23 pounds   Total visits 10    OB History    Para Term  AB Living   6 3 3 0 2 3   SAB TAB Ectopic Molar Multiple Live Births   2 0 0 0 0 3      # Outcome Date GA Lbr Aime/2nd Weight Sex Delivery Anes PTL Lv   6 Current            5 SAB 2018           4 Term 14 40w0d  3459 g (7 lb 10 oz) F Vag-Spont EPI  HARSHAD      Complications: Kidney stones   3 Term 11 39w0d  3232 g (7 lb 2 oz) F Vag-Spont EPI  HARSHAD      Complications: Kidney stones   2 Term 08 40w0d  3884 g (8 lb 9 oz) M Vag-Spont EPI  HARSHAD      Complications: Kidney stones   1 SAB              Past Medical History:   Diagnosis Date   • Kidney stones    • Positive testing for group B Streptococcus 2020   • Rh negative status during pregnancy 2018   • Wears contact lenses      Past Surgical History:   Procedure Laterality Date   • COLONOSCOPY     • D&C WITH SUCTION N/A 2019    Procedure: DILATATION AND CURETTAGE WITH SUCTION;  Surgeon: Aleksey Danielle MD;  Location: [facility];  Service: Obstetrics/Gynecology   • DILATATION AND CURETTAGE         Review of Systems        Pertinent items are noted in HPI, all other systems reviewed and negative  Allergies   Allergen Reactions   • Amoxicillin Hives     Social History    Tobacco Use      Smoking status: Never  "Smoker      Smokeless tobacco: Never Used      BP 95/55 (BP Location: Left arm, Patient Position: Lying)   Pulse 82   Temp 98.9 °F (37.2 °C) (Oral)   Resp 16   Ht 162 cm (63.78\")   Wt 73 kg (161 lb)   LMP 11/10/2019 (Approximate)   SpO2 100%   Breastfeeding Yes   BMI 27.83 kg/m²      Physical Exam       Psych: Altert and oriented to time, place and person  Mood and affect appropriate   General: well developed; well nourished  no acute distress  Heart: regular rate and rhythm, no murmur  Lungs:  breathing is unlabored  clear to auscultation bilaterally  Abdomen: Gravid - soft and non-tender EFW 8 pounds  ctx's this AM irregular - pit at 10 mu  FHT's 135 + accels and variability  Lower Extremities: Neg edema and DTR's 2+   Skin: warm and dry  V/E: tight 2/50% /-1  Cephalic                   Prenatal Labs  Lab Results   Component Value Date    HGB 11.4 (L) 08/10/2020    HEPBSAG Negative 2020    ABSCRN Positive 08/10/2020    [id number] Non Reactive 2020    HEPCVIRUSABY <0.1 2020    STREPGPB Positive (A) 2020       Current Labs Reviewed   CBC    Assessment:  1. UP with an Estimated Date of Delivery: 20  2. Induction of labor because of elective at term per patient request  3. Group B strep status: positive  4. FHT's reassuring     Plan:    1.  Admit to  with Pitocin drip - RSO - will increase pitocin every 15 min until regular contractions  - GBS antibiotics  2.  Pain meds / Epidural optional   3.  Amniotomy with descent of presenting part  4.  Anticipate    5.  Ross in 1-2 hrs    0945  Feeling contractions   Ctx's every 2-3 min - pit at 14 mu  FHTs 145 + accels and variability  V/E 2/60%-1 well applied AROM small clear fluid   Plan  Anticipate    Encouraged questions and answered         Sienna Garcia CNM  2020  09:55       Electronically signed by Aleksey Danielle MD at 20 1019          Operative/Procedure Notes (last 48 hours) (Notes from 08/10/20 " 08 through 20 08)      Sienna Garcia CNM at 20 1705          [facility]  Vaginal Delivery Note    Delivery     Delivery: Vaginal, Spontaneous     YOB: 2022    Time of Birth:  Gestational Age 3:59 PM   39w2d     Anesthesia: Epidural     Delivering clinician: Sienna Garcia CNM   Forceps?   No   Vacuum? No    Shoulder dystocia present: No        Delivery narrative:      of viable male over an intact perineum - loose nuchal cord and reduced with delivery of infant bulb sx'd and to moms abdomen - & dried - delayed cord clamping x 30 sec - double clamped and cut per FOB.  Apgars 7&9.   Spontaneous delivery of placenta - intact - 20 units pitocin to IVF's - F/F with message.  Vaginal inspection - very small second degree sutured with chrooo CT.   cc/         Infant    Findings: male  infant     Infant observations: Weight: 3118 g (6 lb 14 oz)   Length: 18  in  Observations/Comments:         Apgars:   7 @ 1 minute /      9@ 5 minutes   Infant Name: Kalia     Placenta, Cord, and Fluid    Placenta delivered  Spontaneous  at   2020  4:05 PM     Cord: 3 vessels  present.   Nuchal Cord?  yes; Number of nuchal loops present:    1   Cord blood obtained: Yes    Cord gases obtained:  No    Cord gas results: Venous:  No results found for: PHCVEN    Arterial:  No results found for: PHCART     Repair    Episiotomy: No        Lacerations: See note above    Estimated Blood Loss: Est. Blood Loss (mL): 200 mL(Filed from Delivery Summary) (20 1559)           Complications  none    Disposition  Mother to Mother Baby/Postpartum  in stable condition currently.  Baby to remains with mom  in stable condition currently.      Sienna Garcia CNM  20  17:05        Electronically signed by Sienna Garcia CNM at 20 1711         Physician Progress Notes (last 48 hours) (Notes from 08/10/20 08 through 20 08)    No notes of this type exist for this encounter.

## 2020-08-12 NOTE — NURSING NOTE
"Dsicharge teaching completed using, \"A New Beginning\" book. Pt. verbalized understanding of take home instructions.  "

## 2020-08-12 NOTE — PLAN OF CARE
Problem: Patient Care Overview  Goal: Plan of Care Review  8/12/2020 0424 by Stephie Champagne, RN  Outcome: Ongoing (interventions implemented as appropriate)  Flowsheets  Taken 8/12/2020 0424 by Stephie Champagne, RN  Progress: improving  Outcome Summary: VSS, adequate pain control, breastfeeding progressing,continue with routine pp care  Taken 8/11/2020 0700 by Becky Verdugo, RN  Plan of Care Reviewed With: patient;spouse

## 2020-08-12 NOTE — PROGRESS NOTES
"[facility]  Vaginal Delivery Progress Note    Subjective   Subjective  Postpartum Day 1: Vaginal Delivery    The patient feels well.  Her pain is well controlled with analgesics.   She is ambulating well.  Patient describes her bleeding as thin lochia.    Breastfeeding: without difficulty.    Objective     Objective   Vital Signs Range for the last 24 hours  Temperature: Temp:  [97.8 °F (36.6 °C)-98.9 °F (37.2 °C)] 97.8 °F (36.6 °C)   Temp Source: Temp src: Oral   BP: BP: ()/(17-83) 106/62   Pulse: Heart Rate:  [] 73   Respirations: Resp:  [15-18] 16   SPO2: SpO2:  [88 %-100 %] 99 %   O2 Amount (l/min):     O2 Devices     Weight:       Admit Height:  Height: 162 cm (63.78\")    Physical Exam:  General:  no acute distresss.  Abdomen: Fundus: appropriate, firm, non tender  Extremities: normal, atraumatic, no cyanosis, and trace edema.       Lab results reviewed:  Yes   Rubella:  No results found for: RUBELLAIGGIN Nurse Transcribed from prenatal record --    Rubella Antibodies, IgG   Date Value Ref Range Status   01/09/2020 1.30 Immune >0.99 index Final     Comment:                                     Non-immune       <0.90                                  Equivocal  0.90 - 0.99                                  Immune           >0.99       Rh Status:    RH type   Date Value Ref Range Status   08/10/2020 Negative  Final     Rh Factor   Date Value Ref Range Status   01/09/2020 Negative  Final     Comment:     Please note: Prior records for this patient's ABO / Rh type are not  available for additional verification.       Immunizations:   Immunization History   Administered Date(s) Administered   • Rho (D) Immune Globulin 01/02/2019, 06/08/2020       Assessment/Plan   Assessment & Plan    Pregnancy      Inesgregg Huntley is Day 1  post-partum  Vaginal, Spontaneous  Delivery Complications:very small small 2nd degree per maya srivastava aprn .      Plan:  Continue current care. Patient voices desire to go home later " today if baby dismissed. Will reevaluate at 24 hours postpartum      Taylor Middleton PA-C  8/12/2020  09:10

## 2020-08-13 NOTE — DISCHARGE SUMMARY
Discharge Summary     Eddie Huntley  : 1990  MRN: [id number]  [facility]: [id number]    Date of Admission: 8/10/2020   Date of Discharge:  2020   Delivering Physician: Sienna Garcia        Admission Diagnosis: 1. Pregnancy [Z34.90]   Discharge Diagnosis: 1. Same as above plus  2. Pregnancy at 39w2d - delivered       Procedures: 2020  - Vaginal, Spontaneous       Hospital Course  Patient is a 30 y.o.  who at 39w2d had a vaginal birth.  Her postpartum course was without complications.  On PPD #1 she was ready for discharge.  She had normal lochia and pain well controlled with oral medications.    Physical Exam (see earlier note)  Min/max vitals past 24 hours:   Temp  Min: 98 °F (36.7 °C)  Max: 98.8 °F (37.1 °C)  BP  Min: 107/59  Max: 108/58  Pulse  Min: 78  Max: 80  Resp  Min: 18  Max: 18          I/O last 3 completed shifts:  In: -   Out: 600 [Urine:600]    Infant  male  fetus weighing 3118 g (6 lb 14 oz)   Apgars -  7  @ 1 minute /  9  @ 5 minutes.    Discharge labs  Lab Results   Component Value Date    WBC 10.76 2020    HGB 11.2 (L) 2020    HCT 33.5 (L) 2020     2020       Discharge Medications     Discharge Medications      New Medications      Instructions Start Date   HYDROcodone-acetaminophen 5-325 MG per tablet  Commonly known as:  NORCO   1 tablet, Oral, Every 6 Hours PRN      Stool Softener 100 MG capsule  Generic drug:  docusate sodium   100 mg, Oral, 2 Times Daily         Changes to Medications      Instructions Start Date   sertraline 50 MG tablet  Commonly known as:  ZOLOFT  What changed:  how much to take   75 mg, Oral, Daily         Continue These Medications      Instructions Start Date   prenatal vitamin 27-0.8 27-0.8 MG tablet tablet   1 tablet, Oral, Daily             Discharge Disposition Home   Condition on Discharge: Stable   Follow-up: 6 weeks with [facility]   Time spent: 15  minutes  Zuleika Sinclair M.D.  2020

## 2020-08-14 LAB
BH BB BLOOD EXPIRATION DATE: NORMAL
BH BB BLOOD EXPIRATION DATE: NORMAL
BH BB BLOOD TYPE BARCODE: 9500
BH BB BLOOD TYPE BARCODE: 9500
BH BB DISPENSE STATUS: NORMAL
BH BB DISPENSE STATUS: NORMAL
BH BB PRODUCT CODE: NORMAL
BH BB PRODUCT CODE: NORMAL
BH BB UNIT NUMBER: NORMAL
BH BB UNIT NUMBER: NORMAL
CROSSMATCH INTERPRETATION: NORMAL
CROSSMATCH INTERPRETATION: NORMAL
UNIT  ABO: NORMAL
UNIT  ABO: NORMAL
UNIT  RH: NORMAL
UNIT  RH: NORMAL

## 2020-08-17 LAB — ANTI-D: NORMAL

## 2021-03-10 NOTE — TELEPHONE ENCOUNTER
----- Message from Rose Mary Sepulveda sent at 3/10/2021 11:03 AM EST -----  [facility] She asked for a script for Zoloft to be sent to: [facility]. (Dr Sinclair's pt)

## 2021-03-23 ENCOUNTER — BULK ORDERING (OUTPATIENT)
Dept: CASE MANAGEMENT | Facility: OTHER | Age: 31
End: 2021-03-23

## 2021-03-23 DIAGNOSIS — Z23 IMMUNIZATION DUE: ICD-10-CM

## 2022-09-16 ENCOUNTER — NURSE TRIAGE (OUTPATIENT)
Dept: OTHER | Facility: CLINIC | Age: 32
End: 2022-09-16

## 2022-09-16 NOTE — TELEPHONE ENCOUNTER
Received call from Afshin Anderson at [facility] with Red Flag Complaint. Subjective: Caller states \"I've had issues in the past with my blood sugar but I have been getting really weak and trembling. \"     Current Symptoms: Trembling, weakness- intermittent     Onset: a few months ago; worsening- chronic issues last few years- worsening in last few months    Associated Symptoms: Anxiety issues per patient    Pain Severity: 0/10; N/A    Temperature: Denies    What has been tried: glucose tabs    LMP:  Currently  Pregnant: No    Recommended disposition: See in Office Within 2 Weeks    Care advice provided, patient verbalizes understanding; denies any other questions or concerns; instructed to call back for any new or worsening symptoms. Patient needs  an office visit within two weeks for possible low blood sugar symptoms including weakness trembling. Attention Provider: Thank you for allowing me to participate in the care of your patient. The patient was connected to triage in response to information provided to the ECC/PSC. Please do not respond through this encounter as the response is not directed to a shared pool.     Reason for Disposition   Low blood sugar prevention, questions about    Protocols used: Diabetes - Low Blood Sugar-ADULT-[place]

## 2022-09-26 ENCOUNTER — OFFICE VISIT (OUTPATIENT)
Dept: PRIMARY CARE CLINIC | Age: 32
End: 2022-09-26
Payer: MEDICAID

## 2022-09-26 ENCOUNTER — HOSPITAL ENCOUNTER (OUTPATIENT)
Facility: HOSPITAL | Age: 32
Discharge: HOME | End: 2022-09-26
Payer: MEDICAID

## 2022-09-26 VITALS
SYSTOLIC BLOOD PRESSURE: 108 MMHG | OXYGEN SATURATION: 98 % | WEIGHT: 143.2 LBS | TEMPERATURE: 97.7 F | DIASTOLIC BLOOD PRESSURE: 76 MMHG | HEART RATE: 77 BPM | BODY MASS INDEX: 24.58 KG/M2

## 2022-09-26 DIAGNOSIS — D50.9 IRON DEFICIENCY ANEMIA, UNSPECIFIED IRON DEFICIENCY ANEMIA TYPE: Primary | ICD-10-CM

## 2022-09-26 DIAGNOSIS — F32.A ANXIETY AND DEPRESSION: ICD-10-CM

## 2022-09-26 DIAGNOSIS — F41.9 ANXIETY AND DEPRESSION: ICD-10-CM

## 2022-09-26 DIAGNOSIS — D50.9 IRON DEFICIENCY ANEMIA, UNSPECIFIED IRON DEFICIENCY ANEMIA TYPE: ICD-10-CM

## 2022-09-26 DIAGNOSIS — E16.2 HYPOGLYCEMIA: ICD-10-CM

## 2022-09-26 DIAGNOSIS — N94.6 DYSMENORRHEA: ICD-10-CM

## 2022-09-26 LAB
A/G RATIO: 2 (ref 0.8–2)
ALBUMIN SERPL-MCNC: 4.7 G/DL (ref 3.4–4.8)
ALP BLD-CCNC: 85 U/L (ref 25–100)
ALT SERPL-CCNC: 9 U/L (ref 4–36)
ANION GAP SERPL CALCULATED.3IONS-SCNC: 9 MMOL/L (ref 3–16)
AST SERPL-CCNC: 15 U/L (ref 8–33)
BASOPHILS ABSOLUTE: 0 K/UL (ref 0–0.1)
BASOPHILS RELATIVE PERCENT: 0.5 %
BILIRUB SERPL-MCNC: 0.5 MG/DL (ref 0.3–1.2)
BUN BLDV-MCNC: 8 MG/DL (ref 6–20)
CALCIUM SERPL-MCNC: 9.4 MG/DL (ref 8.5–10.5)
CHLORIDE BLD-SCNC: 103 MMOL/L (ref 98–107)
CO2: 28 MMOL/L (ref 20–30)
CREAT SERPL-MCNC: 0.7 MG/DL (ref 0.4–1.2)
EOSINOPHILS ABSOLUTE: 0 K/UL (ref 0–0.4)
EOSINOPHILS RELATIVE PERCENT: 0.5 %
FERRITIN: 37.5 NG/ML (ref 22–322)
GFR AFRICAN AMERICAN: >59
GFR NON-AFRICAN AMERICAN: >60
GLOBULIN: 2.3 G/DL
GLUCOSE BLD-MCNC: 82 MG/DL (ref 74–106)
HBA1C MFR BLD: 4.9 %
HCT VFR BLD CALC: 42.1 % (ref 37–47)
HEMOGLOBIN: 14.1 G/DL (ref 11.5–16.5)
IMMATURE GRANULOCYTES #: 0 K/UL
IMMATURE GRANULOCYTES %: 0.1 % (ref 0–5)
IRON SATURATION: 29 % (ref 15–50)
IRON: 80 UG/DL (ref 37–145)
LYMPHOCYTES ABSOLUTE: 2.1 K/UL (ref 1.5–4)
LYMPHOCYTES RELATIVE PERCENT: 28.6 %
MCH RBC QN AUTO: 30.2 PG (ref 27–32)
MCHC RBC AUTO-ENTMCNC: 33.5 G/DL (ref 31–35)
MCV RBC AUTO: 90.1 FL (ref 80–100)
MONOCYTES ABSOLUTE: 0.5 K/UL (ref 0.2–0.8)
MONOCYTES RELATIVE PERCENT: 7 %
NEUTROPHILS ABSOLUTE: 4.7 K/UL (ref 2–7.5)
NEUTROPHILS RELATIVE PERCENT: 63.3 %
PDW BLD-RTO: 12.8 % (ref 11–16)
PLATELET # BLD: 187 K/UL (ref 150–400)
PMV BLD AUTO: 11.2 FL (ref 6–10)
POTASSIUM SERPL-SCNC: 4.1 MMOL/L (ref 3.4–5.1)
RBC # BLD: 4.67 M/UL (ref 3.8–5.8)
SODIUM BLD-SCNC: 140 MMOL/L (ref 136–145)
T4 FREE: 0.99 NG/DL (ref 0.89–1.76)
TOTAL IRON BINDING CAPACITY: 278 UG/DL (ref 250–450)
TOTAL PROTEIN: 7 G/DL (ref 6.4–8.3)
TSH SERPL DL<=0.05 MIU/L-ACNC: 1.48 UIU/ML (ref 0.27–4.2)
WBC # BLD: 7.4 K/UL (ref 4–11)

## 2022-09-26 PROCEDURE — 80053 COMPREHEN METABOLIC PANEL: CPT

## 2022-09-26 PROCEDURE — 99214 OFFICE O/P EST MOD 30 MIN: CPT | Performed by: PHYSICIAN ASSISTANT

## 2022-09-26 PROCEDURE — G8420 CALC BMI NORM PARAMETERS: HCPCS | Performed by: PHYSICIAN ASSISTANT

## 2022-09-26 PROCEDURE — 84439 ASSAY OF FREE THYROXINE: CPT

## 2022-09-26 PROCEDURE — 84443 ASSAY THYROID STIM HORMONE: CPT

## 2022-09-26 PROCEDURE — G8427 DOCREV CUR MEDS BY ELIG CLIN: HCPCS | Performed by: PHYSICIAN ASSISTANT

## 2022-09-26 PROCEDURE — 85025 COMPLETE CBC W/AUTO DIFF WBC: CPT

## 2022-09-26 PROCEDURE — 1036F TOBACCO NON-USER: CPT | Performed by: PHYSICIAN ASSISTANT

## 2022-09-26 PROCEDURE — 82728 ASSAY OF FERRITIN: CPT

## 2022-09-26 PROCEDURE — 83540 ASSAY OF IRON: CPT

## 2022-09-26 PROCEDURE — 83550 IRON BINDING TEST: CPT

## 2022-09-26 PROCEDURE — 83036 HEMOGLOBIN GLYCOSYLATED A1C: CPT

## 2022-09-26 ASSESSMENT — PATIENT HEALTH QUESTIONNAIRE - PHQ9
SUM OF ALL RESPONSES TO PHQ QUESTIONS 1-9: 0
2. FEELING DOWN, DEPRESSED OR HOPELESS: 0
1. LITTLE INTEREST OR PLEASURE IN DOING THINGS: 0
SUM OF ALL RESPONSES TO PHQ9 QUESTIONS 1 & 2: 0
SUM OF ALL RESPONSES TO PHQ QUESTIONS 1-9: 0

## 2022-09-26 ASSESSMENT — ENCOUNTER SYMPTOMS
COUGH: 0
ABDOMINAL PAIN: 0
EYE PAIN: 0
SHORTNESS OF BREATH: 0
DIARRHEA: 0
CONSTIPATION: 0
SORE THROAT: 0

## 2022-09-26 NOTE — PROGRESS NOTES
Mary Andrew (:  1990) is a 27 y.o. female,New patient, here for evaluation of the following chief complaint(s):  Establish Care and Anxiety           Subjective   SUBJECTIVE/OBJECTIVE:  HPI  Mrs. Andrew is a pleasant 27year old female with PMH Anxiety and Depression who presents to clinic today to establish. She had 2 miscarriages and it caused worsening depression; she took Zoloft for a while and did notice some improvement. She now has a 3year old. She tried Celexa in the past with side effects. She feels her depression is stable and she is considering another child and not using contraception. She is concerned with \"her sugar dropping on her\". She has episodes where she will feel real shaky. She relates she has checked her glucose in the past with her aunts glucometer reporting 60. She reports to eating frequent meals. She has not had pap since her baby was born. She also complains of cramping with periods and she uses motrin as needed. She was on iron with pregnancy due to iron deficiency anemia. She also had C Diff colitis due to antibiotic use from MRSA skin infections in the past.  Family history of thyroid disease in her sister and her mother. Past Medical History:   Diagnosis Date    Colitis       History reviewed. No pertinent surgical history. Review of Systems   Constitutional:  Negative for chills, fatigue and fever. HENT:  Negative for congestion, ear pain, nosebleeds and sore throat. Eyes:  Negative for pain and visual disturbance. Respiratory:  Negative for cough and shortness of breath. Cardiovascular:  Negative for chest pain and palpitations. Gastrointestinal:  Negative for abdominal pain, constipation and diarrhea. Genitourinary:  Negative for difficulty urinating and flank pain. Musculoskeletal:  Negative for arthralgias, gait problem and myalgias. Skin:  Negative for rash. Neurological:  Negative for syncope, light-headedness and headaches. Panel; Future  - TSH; Future  - T4, Free; Future    3. Hypoglycemia  - Hemoglobin A1C; Future    4. Dysmenorrhea  DC Motrin and use Aleve BID prn with food. Return in about 3 months (around 12/26/2022). An electronic signature was used to authenticate this note.     --Maryjane Cook PA-C

## 2022-12-27 ENCOUNTER — TELEPHONE (OUTPATIENT)
Dept: OBSTETRICS AND GYNECOLOGY | Facility: CLINIC | Age: 32
End: 2022-12-27

## 2022-12-27 ENCOUNTER — LAB (OUTPATIENT)
Dept: LAB | Facility: HOSPITAL | Age: 32
End: 2022-12-27

## 2022-12-27 DIAGNOSIS — Z32.00 POSSIBLE PREGNANCY, NOT CONFIRMED: Primary | ICD-10-CM

## 2022-12-27 LAB — HCG INTACT+B SERPL-ACNC: 189 MIU/ML

## 2022-12-27 PROCEDURE — 84702 CHORIONIC GONADOTROPIN TEST: CPT | Performed by: MIDWIFE

## 2022-12-27 PROCEDURE — 36415 COLL VENOUS BLD VENIPUNCTURE: CPT | Performed by: MIDWIFE

## 2022-12-27 NOTE — TELEPHONE ENCOUNTER
Patient called today stating that she had a home positive pregnancy test. Recently has had some spotting. I have ordered an HCG for the patient to come in to complete.

## 2022-12-28 ENCOUNTER — TELEPHONE (OUTPATIENT)
Dept: OBSTETRICS AND GYNECOLOGY | Facility: CLINIC | Age: 32
End: 2022-12-28

## 2022-12-28 DIAGNOSIS — O20.9 BLEEDING IN EARLY PREGNANCY: Primary | ICD-10-CM

## 2022-12-28 NOTE — TELEPHONE ENCOUNTER
Patient called asking for her lab results. I spoke with Dr Rose and she gave me a verbal to make patient aware that labs were rising appropriately, but she would like to have that repeated tomorrow to have comparison. I called patient back and made her aware. Patient understood and stated she would come in tomorrow.

## 2022-12-29 ENCOUNTER — LAB (OUTPATIENT)
Dept: LAB | Facility: HOSPITAL | Age: 32
End: 2022-12-29
Payer: COMMERCIAL

## 2022-12-29 LAB — HCG INTACT+B SERPL-ACNC: 472 MIU/ML

## 2022-12-29 PROCEDURE — 84702 CHORIONIC GONADOTROPIN TEST: CPT | Performed by: STUDENT IN AN ORGANIZED HEALTH CARE EDUCATION/TRAINING PROGRAM

## 2022-12-29 PROCEDURE — 36415 COLL VENOUS BLD VENIPUNCTURE: CPT | Performed by: STUDENT IN AN ORGANIZED HEALTH CARE EDUCATION/TRAINING PROGRAM

## 2023-01-27 ENCOUNTER — INITIAL PRENATAL (OUTPATIENT)
Dept: OBSTETRICS AND GYNECOLOGY | Facility: CLINIC | Age: 33
End: 2023-01-27
Payer: COMMERCIAL

## 2023-01-27 VITALS — BODY MASS INDEX: 24.96 KG/M2 | DIASTOLIC BLOOD PRESSURE: 64 MMHG | WEIGHT: 144.4 LBS | SYSTOLIC BLOOD PRESSURE: 110 MMHG

## 2023-01-27 DIAGNOSIS — Z34.90 PREGNANCY, UNSPECIFIED GESTATIONAL AGE: Primary | ICD-10-CM

## 2023-01-27 PROCEDURE — 99395 PREV VISIT EST AGE 18-39: CPT | Performed by: OBSTETRICS & GYNECOLOGY

## 2023-01-27 NOTE — PROGRESS NOTES
Subjective   Chief Complaint   Patient presents with   • Initial Prenatal Visit     New OB patient with TVS done today. LMP 2022. No problems or concerns     Ines Huntley is a 32 y.o. year old .  Patient's last menstrual period was 2022 (approximate).  She presents to be seen because of initiation obstetrical care.  Patient had 4 prior vaginal deliveries.  Proven to 8 pounds 9 ounces.  Most recent delivery was in 2020.  All term..     OTHER COMPLAINTS:  Nothing else    The following portions of the patient's history were reviewed and updated as appropriate:  She  has a past medical history of Kidney stones, Positive testing for group B Streptococcus (2020), Recurrent pregnancy loss, antepartum condition or complication, Rh negative status during pregnancy (2018), and Wears contact lenses.  She does not have any pertinent problems on file.  She  has a past surgical history that includes Dilation and curettage of uterus; Colonoscopy; and d & c with suction (N/A, 2019).  Her family history is not on file.  She  reports that she has never smoked. She has never used smokeless tobacco. She reports that she does not drink alcohol and does not use drugs.  Current Outpatient Medications   Medication Sig Dispense Refill   • docusate sodium (Colace) 100 MG capsule Take 1 capsule by mouth 2 (Two) Times a Day. 60 capsule 0   • HYDROcodone-acetaminophen (NORCO) 5-325 MG per tablet Take 1 tablet by mouth Every 6 (Six) Hours As Needed for Moderate Pain  or Severe Pain 10 tablet 0   • Prenatal Vit-Fe Fumarate-FA (PRENATAL VITAMIN 27-0.8) 27-0.8 MG tablet tablet Take 1 tablet by mouth Daily.     • sertraline (ZOLOFT) 50 MG tablet TAKE 1.5 TABLETS BY MOUTH DAILY. 60 tablet 3     No current facility-administered medications for this visit.     Current Outpatient Medications on File Prior to Visit   Medication Sig   • docusate sodium (Colace) 100 MG capsule Take 1 capsule by mouth 2 (Two)  Times a Day.   • HYDROcodone-acetaminophen (NORCO) 5-325 MG per tablet Take 1 tablet by mouth Every 6 (Six) Hours As Needed for Moderate Pain  or Severe Pain   • Prenatal Vit-Fe Fumarate-FA (PRENATAL VITAMIN 27-0.8) 27-0.8 MG tablet tablet Take 1 tablet by mouth Daily.   • sertraline (ZOLOFT) 50 MG tablet TAKE 1.5 TABLETS BY MOUTH DAILY.     No current facility-administered medications on file prior to visit.     She is allergic to amoxicillin.    Social History    Tobacco Use      Smoking status: Never      Smokeless tobacco: Never    Review of Systems  Consitutional POS: nothing reported    NEG: anorexia or night sweats   Gastointestinal POS: nothing reported    NEG: bloating, change in bowel habits, melena or reflux symptoms   Genitourinary POS: nothing reported    NEG: dysuria or hematuria   Integument POS: nothing reported    NEG: moles that are changing in size, shape, color or rashes   Breast POS: nothing reported    NEG: persistent breast lump, skin dimpling or nipple discharge         Respiratory: negative  Cardiovascular: negative          Objective   /64   Wt 65.5 kg (144 lb 6.4 oz)   LMP 12/01/2022 (Approximate)   BMI 24.96 kg/m²     General:  well developed; well nourished  no acute distress   Skin:  No suspicious lesions seen   Thyroid: normal to inspection and palpation   Lungs:  breathing is unlabored  clear to auscultation bilaterally   Heart:  Not performed.   Breasts:  Examined in supine position  Symmetric without masses or skin dimpling  Nipples normal without inversion, lesions or discharge  There are no palpable axillary nodes   Abdomen: soft, non-tender; no masses  no umbilical or inguinal hernias are present  no hepato-splenomegaly   Pelvis: Clinical staff was present for exam  External genitalia:  normal appearance of the external genitalia including Bartholin's and [place]'s glands.  :  urethral meatus normal;  Vaginal:  normal pink mucosa without prolapse or lesions.  Cervix:   normal appearance.  Uterus:  normal size, shape and consistency.  Adnexa:  normal bimanual exam of the adnexa.  Rectal:  digital rectal exam not performed; anus visually normal appearing.     Psychiatric: Alert and oriented ×3, mood and affect appropriate  HEENT: Atraumatic, normocephalic, normal scleral icterus  Extremities: 2+ pulses bilaterally, no edema      Lab Review   CBC, CMP and TSH    Imaging   Pelvic ultrasound images independantly reviewed - 8-week 4-day IUP with positive cardiac activity.  Normal adnexa.  Corpus luteum 2.1 on the left.  No masses.  No free fluid        Assessment   1. Viable intrauterine pregnancy size consistent with dates.  MATTHEW will be 9/7/2023  2. Bridges multiparity with 4 prior vaginal deliveries with no issues term.     Plan   1. PAP done  2. PNl's    No orders of the defined types were placed in this encounter.         This note was electronically signed.      January 27, 2023

## 2023-01-30 LAB
ABO GROUP BLD: NORMAL
AMPHETAMINES UR QL SCN: NEGATIVE NG/ML
BARBITURATES UR QL SCN: NEGATIVE NG/ML
BASOPHILS # BLD AUTO: 0 X10E3/UL (ref 0–0.2)
BASOPHILS NFR BLD AUTO: 1 %
BENZODIAZ UR QL SCN: NEGATIVE NG/ML
BLD GP AB SCN SERPL QL: NEGATIVE
BZE UR QL SCN: NEGATIVE NG/ML
CANNABINOIDS UR QL SCN: NEGATIVE NG/ML
CREAT UR-MCNC: 60.3 MG/DL (ref 20–300)
EOSINOPHIL # BLD AUTO: 0 X10E3/UL (ref 0–0.4)
EOSINOPHIL NFR BLD AUTO: 0 %
ERYTHROCYTE [DISTWIDTH] IN BLOOD BY AUTOMATED COUNT: 13.3 % (ref 11.7–15.4)
HBV SURFACE AG SERPL QL IA: NEGATIVE
HCT VFR BLD AUTO: 39.5 % (ref 34–46.6)
HCV AB S/CO SERPL IA: <0.1 S/CO RATIO (ref 0–0.9)
HGB BLD-MCNC: 13.2 G/DL (ref 11.1–15.9)
HIV 1+2 AB+HIV1 P24 AG SERPL QL IA: NON REACTIVE
IMM GRANULOCYTES # BLD AUTO: 0 X10E3/UL (ref 0–0.1)
IMM GRANULOCYTES NFR BLD AUTO: 1 %
LABORATORY COMMENT REPORT: NORMAL
LYMPHOCYTES # BLD AUTO: 2 X10E3/UL (ref 0.7–3.1)
LYMPHOCYTES NFR BLD AUTO: 25 %
MCH RBC QN AUTO: 30.1 PG (ref 26.6–33)
MCHC RBC AUTO-ENTMCNC: 33.4 G/DL (ref 31.5–35.7)
MCV RBC AUTO: 90 FL (ref 79–97)
METHADONE UR QL SCN: NEGATIVE NG/ML
MONOCYTES # BLD AUTO: 0.5 X10E3/UL (ref 0.1–0.9)
MONOCYTES NFR BLD AUTO: 7 %
NEUTROPHILS # BLD AUTO: 5.2 X10E3/UL (ref 1.4–7)
NEUTROPHILS NFR BLD AUTO: 66 %
OPIATES UR QL SCN: NEGATIVE NG/ML
OXYCODONE+OXYMORPHONE UR QL SCN: NEGATIVE NG/ML
PCP UR QL: NEGATIVE NG/ML
PH UR: 7.4 [PH] (ref 4.5–8.9)
PLATELET # BLD AUTO: 203 X10E3/UL (ref 150–450)
PROPOXYPH UR QL SCN: NEGATIVE NG/ML
RBC # BLD AUTO: 4.38 X10E6/UL (ref 3.77–5.28)
RH BLD: NEGATIVE
RPR SER QL: NON REACTIVE
RUBV IGG SERPL IA-ACNC: 1.13 INDEX
WBC # BLD AUTO: 7.8 X10E3/UL (ref 3.4–10.8)

## 2023-02-01 LAB — REF LAB TEST METHOD: NORMAL

## 2023-02-10 ENCOUNTER — ROUTINE PRENATAL (OUTPATIENT)
Dept: OBSTETRICS AND GYNECOLOGY | Facility: CLINIC | Age: 33
End: 2023-02-10
Payer: COMMERCIAL

## 2023-02-10 VITALS — BODY MASS INDEX: 25.23 KG/M2 | SYSTOLIC BLOOD PRESSURE: 110 MMHG | DIASTOLIC BLOOD PRESSURE: 70 MMHG | WEIGHT: 146 LBS

## 2023-02-10 DIAGNOSIS — Z34.81 ENCOUNTER FOR SUPERVISION OF OTHER NORMAL PREGNANCY IN FIRST TRIMESTER: Primary | ICD-10-CM

## 2023-02-10 PROBLEM — O26.899 RH NEGATIVE STATUS DURING PREGNANCY: Status: RESOLVED | Noted: 2018-11-20 | Resolved: 2023-02-10

## 2023-02-10 PROBLEM — B95.1 POSITIVE TESTING FOR GROUP B STREPTOCOCCUS: Status: RESOLVED | Noted: 2020-07-24 | Resolved: 2023-02-10

## 2023-02-10 PROBLEM — Z67.91 RH NEGATIVE STATUS DURING PREGNANCY: Status: RESOLVED | Noted: 2018-11-20 | Resolved: 2023-02-10

## 2023-02-10 PROBLEM — Z34.90 PREGNANCY: Status: RESOLVED | Noted: 2020-08-10 | Resolved: 2023-02-10

## 2023-02-10 PROCEDURE — 99213 OFFICE O/P EST LOW 20 MIN: CPT | Performed by: MIDWIFE

## 2023-02-10 NOTE — PROGRESS NOTES
Chief Complaint   Patient presents with   • Routine Prenatal Visit     Repeat TVS, No Complaints/concerns        HPI: Ines is a  currently at 10w4d here for prenatal visit who reports the following:  She denies any problems. She has no pregnancy symptoms so that made her nervous.                EXAM:     Vitals:    02/10/23 1028   BP: 110/70      Abdomen:   See prenatal flowsheet as noted and reviewed, soft, nontender   Pelvic:  See prenatal flowsheet as noted and reviewed   Urine:  See prenatal flowsheet as noted and reviewed    Lab Results   Component Value Date    ABO O 2023    RH Negative 2023    ABSCRN Negative 2023       MDM:  Impression: Rh negative  AMA - declines testing  hx SAB   Tests done today: U/S for F/U Left ovarian cyst smaller @ 1.6 cm  US Ob < 14 Weeks Single or First Gestation (02/10/2023 10:27)     Topics discussed: genetics screening  Reviewed OB labs   Tests next visit: none                RTO:                        4 weeks    This note was electronically signed.  FIDELINA Batista  2/10/2023

## 2023-03-06 ENCOUNTER — TELEPHONE (OUTPATIENT)
Dept: OBSTETRICS AND GYNECOLOGY | Facility: CLINIC | Age: 33
End: 2023-03-06
Payer: COMMERCIAL

## 2023-03-06 NOTE — TELEPHONE ENCOUNTER
Patient called and stated that she is having frequent headaches that is on going for approximately 4 days. Tylenol is not giving patient any relief. Do you recommend any other medications that is safe for her to take while pregnant?

## 2023-03-08 RX ORDER — PROMETHAZINE HYDROCHLORIDE 25 MG/1
25 TABLET ORAL EVERY 6 HOURS PRN
Qty: 30 TABLET | Refills: 0 | Status: SHIPPED | OUTPATIENT
Start: 2023-03-08

## 2023-03-13 ENCOUNTER — ROUTINE PRENATAL (OUTPATIENT)
Dept: OBSTETRICS AND GYNECOLOGY | Facility: CLINIC | Age: 33
End: 2023-03-13
Payer: COMMERCIAL

## 2023-03-13 VITALS — SYSTOLIC BLOOD PRESSURE: 112 MMHG | WEIGHT: 147 LBS | BODY MASS INDEX: 25.41 KG/M2 | DIASTOLIC BLOOD PRESSURE: 70 MMHG

## 2023-03-13 DIAGNOSIS — K21.9 GASTROESOPHAGEAL REFLUX DISEASE WITHOUT ESOPHAGITIS: ICD-10-CM

## 2023-03-13 DIAGNOSIS — O20.9 BLEEDING IN EARLY PREGNANCY: ICD-10-CM

## 2023-03-13 DIAGNOSIS — Z34.82 ENCOUNTER FOR SUPERVISION OF OTHER NORMAL PREGNANCY, SECOND TRIMESTER: Primary | ICD-10-CM

## 2023-03-13 DIAGNOSIS — G43.019 INTRACTABLE MIGRAINE WITHOUT AURA AND WITHOUT STATUS MIGRAINOSUS: ICD-10-CM

## 2023-03-13 PROCEDURE — 99214 OFFICE O/P EST MOD 30 MIN: CPT | Performed by: OBSTETRICS & GYNECOLOGY

## 2023-03-13 NOTE — PROGRESS NOTES
Chief Complaint  Routine Prenatal Visit (No complaints. )    History of Present Illness:  Ines is a  currently at 15w0d who presents today with no significant complaints other than recent headache or migraine lasting for 4 days.  Patient reports this was the worst headache she had ever had.  She had tried Tylenol as well as rest with no improvement.  She did have nausea.  Patient reports having occasional episodes still of nausea.  Patient does reports she has reflux.  She has been taking Tums.  She denies any recent bleeding but did have spotting early in pregnancy.  She denies any cramping or tightening.    Exam:  Vitals:  See prenatal flowsheet as noted and reviewed  General: Alert, cooperative, and does not appear in any distress  Abdomen:   See prenatal flowsheet as noted and reviewed    Uterus gravid, non-tender; no palpable masses    No guarding or rebound tenderness  Pelvic:  See prenatal flowsheet as noted and reviewed  Ext:  See prenatal flowsheet as noted and reviewed    Moves extremities well, no cyanosis and no redness  Urine:  See prenatal flowsheet as noted and reviewed    Data Review:  The following data was reviewed by: Zuleika Sinclair MD on 2023:  Prenatal Labs:  Lab Results   Component Value Date    HGB 13.2 2023    RUBELLAABIGG 1.13 2023    HEPBSAG Negative 2023    ABO O 2023    RH Negative 2023    ABSCRN Negative 2023    [id number] Non Reactive 2023    HEPCVIRUSABY <0.1 2023    STREPGPB Positive (A) 2020       No visits with results within 1 Month(s) from this visit.   Latest known visit with results is:   Initial Prenatal on 2023   Component Date Value   • Hepatitis B Surface Ag 2023 Negative    • Hep C Virus Ab 2023 <0.1    • RPR 2023 Non Reactive    • Rubella Antibodies, IgG 2023 1.13    • ABO Type 2023 O    • Rh Factor 2023 Negative    • Antibody Screen 2023 Negative    • HIV  Screen 4th Gen w/RFX* 2023 Non Reactive    • WBC 2023 7.8    • RBC 2023 4.38    • Hemoglobin 2023 13.2    • Hematocrit 2023 39.5    • MCV 2023 90    • MCH 2023 30.1    • MCHC 2023 33.4    • RDW 2023 13.3    • Platelets 2023 203    • Neutrophil Rel % 2023 66    • Lymphocyte Rel % 2023 25    • Monocyte Rel % 2023 7    • Eosinophil Rel % 2023 0    • Basophil Rel % 2023 1    • Neutrophils Absolute 2023 5.2    • Lymphocytes Absolute 2023 2.0    • Monocytes Absolute 2023 0.5    • Eosinophils Absolute 2023 0.0    • Basophils Absolute 2023 0.0    • Immature Granulocyte Rel* 2023 1    • Immature Grans Absolute 2023 0.0    • Reference Lab Report 2023                      Value:[facility]  [address] [place] [address]  Phone: [phone] or [phone]  Fax: [phone]  French Dent M.D., Medical Director    PATIENT NAME                           LABORATORY NO.  ALVA CASTRO                       [id number]  [id number]                         AGE              SEX  SSN           CLIENT REF #  [facility] BR[facility]O                32      1990  F    [id number]   [id number]    [address] [facility] 3  REQUESTING M.D.     ATTENDING M.D.     COPY TO.  [place] [address]                 DATE COLLECTED      DATE RECEIVED      DATE REPORTED  2023    ThinPrep Pap with [facility]    DIAGNOSIS:  Negative for intraepithelial lesion or malignancy    Multiple factors can influence accuracy of Pap tests; therefore, screening at  regular intervals is necessary for early cancer detection.    COMMENT:                            Benign cellular changes associated with reactive/reparative changes  are present.  Professional interpretation  rendered by Eyal Cardoza M.D., [email or web address] at [employer], [place], [address] [place] [address].  SPECIMEN ADEQUACY:  SATISFACTORY FOR EVALUATION  Transformation zone is present.  SOURCE OF SPECIMEN:  CERVICAL  SLIDES:  1  CLINICAL HISTORY:  A Routine  Pregnancy, unspecified gestational age    Chlamydia / Gonorrhea  CHLAMYDIA TRACHOMATIS: Negative  NEISSERIA GONORRHOEAE: Negative    The Aptima Combo 2 assay is a target amplification nucleic acid probe test that  utilizes target capture for the in vitro qualitative detection and differentiation of  ribosomal RNA from Chlamydia trachomatis and Neisseria gonorrhoeae to aid  in the diagnosis of chlamdial and gonococcal disease using the [place] system.    CYTOTECHNOLOGIST:      BON RODRIGUEZ(ASCP)                             REVIEWED, DIAGNOSED AND  ELECTRONICALLY SIGNED BY:      Eyal Cardoza M.D., F.C.A.P.    CPT CODES:  80615,                           49449, 79649, 28922     • Amphetamine, Urine Qual 01/27/2023 Negative    • Barbiturates Screen, Uri* 01/27/2023 Negative    • Benzodiazepine Screen, U* 01/27/2023 Negative    • THC Screen, Urine 01/27/2023 Negative    • Cocaine Screen, Urine 01/27/2023 Negative    • Opiate Screen, Urine 01/27/2023 Negative    • Oxycodone/Oxymorphone, U* 01/27/2023 Negative    • Phencyclidine (PCP), Uri* 01/27/2023 Negative    • Methadone Screen, Urine 01/27/2023 Negative    • Propoxyphene Screen 01/27/2023 Negative    • Creatinine, Urine 01/27/2023 60.3    • pH, UA 01/27/2023 7.4    • Please note 01/27/2023 Comment      Imaging:  US Ob < 14 Weeks Single or First Gestation  Intrauterine pregnancy 10+ weeks.  Appropriate interval growth.  Positive   cardiac activity.  Corpus luteum left ovary 1.6 cm.  No masses.  No free   fluid.     Medical Records:  None    Assessment and Plan:  Problem List Items Addressed This Visit    None  Visit Diagnoses     Encounter for supervision of other normal pregnancy, second trimester    -  Primary  Topics  discussed:     ab precautions  genetic screening - Today we discussed genetic testing.  She is aware that the MSAFP-4 is a screening test.  A screening test is not a diagnostic test.  This means that a negative test does not guarantee an unaffected fetus and a positive test does not mean the fetus has the condition for which the test is being performed.  If the test returns positive, a diagnostic test should be consider to determine if the fetus in fact has the condition.  After considering the options previously presented, she is not interested in having genetic testing performed.  GERD management  kick counts and fetal movement  PIH precautions  Anatomic scan next visit as noted    Relevant Orders    US Ob 14 + Weeks Single or First Gestation    Bleeding in early pregnancy      Continue modified activities as discussed.  Anatomic scan next visit.    Gastroesophageal reflux disease without esophagitis      Have discussed with the patient the various options for management of her symptoms.  Patient is to call if worsening of her symptoms.    Intractable migraine without aura and without status migrainosus      Patient with intractable migraine lasting for 4 days.  Patient did not get her prescription for Phenergan at present.  She does report her migraine improved.  Patient is instructed to take her Phenergan as needed.  She is to continue with Tylenol as well.  Patient is to call if any further symptoms as patient may need magnesium oxide for prophylaxis.  Instructions and precautions have been given.        Follow Up/Instructions:  Follow up as scheduled.  Patient was given instructions and counseling regarding her condition or for health maintenance advice. Please see specific information pulled into the AVS if appropriate.     Note: Speech recognition transcription software may have been used to dictate portions of this document.  An attempt at proofreading has been made though minor errors in transcription may  still be present.    This note was electronically signed.  Zuleika Sinclair M.D.

## 2023-03-24 ENCOUNTER — OFFICE VISIT (OUTPATIENT)
Dept: PRIMARY CARE CLINIC | Age: 33
End: 2023-03-24

## 2023-03-24 ENCOUNTER — HOSPITAL ENCOUNTER (OUTPATIENT)
Facility: HOSPITAL | Age: 33
Discharge: HOME | End: 2023-03-24
Payer: MEDICAID

## 2023-03-24 VITALS
HEART RATE: 88 BPM | RESPIRATION RATE: 18 BRPM | SYSTOLIC BLOOD PRESSURE: 108 MMHG | TEMPERATURE: 97.9 F | DIASTOLIC BLOOD PRESSURE: 64 MMHG | HEIGHT: 64 IN | WEIGHT: 144 LBS | BODY MASS INDEX: 24.59 KG/M2 | OXYGEN SATURATION: 99 %

## 2023-03-24 DIAGNOSIS — D50.9 IRON DEFICIENCY ANEMIA, UNSPECIFIED IRON DEFICIENCY ANEMIA TYPE: ICD-10-CM

## 2023-03-24 DIAGNOSIS — Z3A.17 17 WEEKS GESTATION OF PREGNANCY: ICD-10-CM

## 2023-03-24 DIAGNOSIS — R00.0 TACHYCARDIA: Primary | ICD-10-CM

## 2023-03-24 DIAGNOSIS — R00.0 TACHYCARDIA: ICD-10-CM

## 2023-03-24 LAB
ALBUMIN SERPL-MCNC: 4 G/DL (ref 3.4–4.8)
ALBUMIN/GLOB SERPL: 1.7 {RATIO} (ref 0.8–2)
ALP SERPL-CCNC: 58 U/L (ref 25–100)
ALT SERPL-CCNC: 12 U/L (ref 4–36)
ANION GAP SERPL CALCULATED.3IONS-SCNC: 10 MMOL/L (ref 3–16)
AST SERPL-CCNC: 15 U/L (ref 8–33)
BASOPHILS # BLD: 0 K/UL (ref 0–0.1)
BASOPHILS NFR BLD: 0.2 %
BILIRUB SERPL-MCNC: 0.3 MG/DL (ref 0.3–1.2)
BUN SERPL-MCNC: 4 MG/DL (ref 6–20)
CALCIUM SERPL-MCNC: 8.7 MG/DL (ref 8.5–10.5)
CHLORIDE SERPL-SCNC: 105 MMOL/L (ref 98–107)
CO2 SERPL-SCNC: 23 MMOL/L (ref 20–30)
CREAT SERPL-MCNC: <0.5 MG/DL (ref 0.4–1.2)
EOSINOPHIL # BLD: 0 K/UL (ref 0–0.4)
EOSINOPHIL NFR BLD: 0.4 %
ERYTHROCYTE [DISTWIDTH] IN BLOOD BY AUTOMATED COUNT: 13.2 % (ref 11–16)
FERRITIN SERPL IA-MCNC: 49.3 NG/ML (ref 22–322)
GFR SERPLBLD CREATININE-BSD FMLA CKD-EPI: >60 ML/MIN/{1.73_M2}
GLOBULIN SER CALC-MCNC: 2.3 G/DL
GLUCOSE SERPL-MCNC: 88 MG/DL (ref 74–106)
HCT VFR BLD AUTO: 34.8 % (ref 37–47)
HGB BLD-MCNC: 11.9 G/DL (ref 11.5–16.5)
IMM GRANULOCYTES # BLD: 0.1 K/UL
IMM GRANULOCYTES NFR BLD: 0.6 % (ref 0–5)
IRON SATN MFR SERPL: 25 % (ref 15–50)
IRON SERPL-MCNC: 69 UG/DL (ref 37–145)
LYMPHOCYTES # BLD: 1.9 K/UL (ref 1.5–4)
LYMPHOCYTES NFR BLD: 21.3 %
MCH RBC QN AUTO: 30.5 PG (ref 27–32)
MCHC RBC AUTO-ENTMCNC: 34.2 G/DL (ref 31–35)
MCV RBC AUTO: 89.2 FL (ref 80–100)
MONOCYTES # BLD: 0.5 K/UL (ref 0.2–0.8)
MONOCYTES NFR BLD: 5.7 %
NEUTROPHILS # BLD: 6.5 K/UL (ref 2–7.5)
NEUTS SEG NFR BLD: 71.8 %
PLATELET # BLD AUTO: 176 K/UL (ref 150–400)
PMV BLD AUTO: 10.9 FL (ref 6–10)
POTASSIUM SERPL-SCNC: 3.6 MMOL/L (ref 3.4–5.1)
PROT SERPL-MCNC: 6.3 G/DL (ref 6.4–8.3)
RBC # BLD AUTO: 3.9 M/UL (ref 3.8–5.8)
SODIUM SERPL-SCNC: 138 MMOL/L (ref 136–145)
T4 FREE SERPL-MCNC: 0.91 NG/DL (ref 0.89–1.76)
TIBC SERPL-MCNC: 277 UG/DL (ref 250–450)
TSH SERPL DL<=0.005 MIU/L-ACNC: 1.17 UIU/ML (ref 0.27–4.2)
WBC # BLD AUTO: 9.1 K/UL (ref 4–11)

## 2023-03-24 PROCEDURE — 84443 ASSAY THYROID STIM HORMONE: CPT

## 2023-03-24 PROCEDURE — 83550 IRON BINDING TEST: CPT

## 2023-03-24 PROCEDURE — 84439 ASSAY OF FREE THYROXINE: CPT

## 2023-03-24 PROCEDURE — 83540 ASSAY OF IRON: CPT

## 2023-03-24 PROCEDURE — 80053 COMPREHEN METABOLIC PANEL: CPT

## 2023-03-24 PROCEDURE — 82728 ASSAY OF FERRITIN: CPT

## 2023-03-24 PROCEDURE — 85025 COMPLETE CBC W/AUTO DIFF WBC: CPT

## 2023-03-24 SDOH — ECONOMIC STABILITY: INCOME INSECURITY: HOW HARD IS IT FOR YOU TO PAY FOR THE VERY BASICS LIKE FOOD, HOUSING, MEDICAL CARE, AND HEATING?: NOT HARD AT ALL

## 2023-03-24 SDOH — ECONOMIC STABILITY: HOUSING INSECURITY
IN THE LAST 12 MONTHS, WAS THERE A TIME WHEN YOU DID NOT HAVE A STEADY PLACE TO SLEEP OR SLEPT IN A SHELTER (INCLUDING NOW)?: NO

## 2023-03-24 SDOH — ECONOMIC STABILITY: FOOD INSECURITY: WITHIN THE PAST 12 MONTHS, THE FOOD YOU BOUGHT JUST DIDN'T LAST AND YOU DIDN'T HAVE MONEY TO GET MORE.: NEVER TRUE

## 2023-03-24 SDOH — ECONOMIC STABILITY: FOOD INSECURITY: WITHIN THE PAST 12 MONTHS, YOU WORRIED THAT YOUR FOOD WOULD RUN OUT BEFORE YOU GOT MONEY TO BUY MORE.: NEVER TRUE

## 2023-03-24 ASSESSMENT — ENCOUNTER SYMPTOMS
EYE PAIN: 0
SORE THROAT: 0
CONSTIPATION: 0
DIARRHEA: 0
COUGH: 0
ABDOMINAL PAIN: 0
SHORTNESS OF BREATH: 0

## 2023-03-24 ASSESSMENT — PATIENT HEALTH QUESTIONNAIRE - PHQ9: DEPRESSION UNABLE TO ASSESS: URGENT/EMERGENT SITUATION

## 2023-03-24 NOTE — PROGRESS NOTES
Chief Complaint   Patient presents with    Tachycardia     Running in the 120s       Have you seen any other physician or provider since your last visit yes - OB    Have you had any other diagnostic tests since your last visit? yes - labs    Have you changed or stopped any medications since your last visit? no     Pt is here today for a same day visit. She is complaining of higher heart rates, possible anxiety. She is 17 weeks pregnant.
DHA and Folic Acid  F[facility] OBG[facility]  - Iron and TIBC; Future  - Ferritin; Future    3. 17 weeks gestation of pregnancy  Stable; Remain on Prenatal and [facility] OBG[facility]      Return in about 1 week (around 3/31/2023). An electronic signature was used to authenticate this note.     --Sebastian Lara PA-C

## 2023-03-24 NOTE — PATIENT INSTRUCTIONS
patches by folding them in half so that the sticky sides meet, and then flushing them down a toilet. They should not be placed in the household trash where children or pets can find them. If you have any questions, ask your provider or pharmacist for more information. Be sure to keep all appointments for provider visits or tests. Keep a list of your medicines with you. List all of the prescription medicines, nonprescription medicines, supplements, natural remedies, and vitamins that you take. Tell your healthcare providers who treat you about all of the products you are taking. Your provider can provide you with a form to keep track of them. Just ask. Follow the directions that come with your medicine, including information about food or alcohol. Make sure you know how and when to take your medicine. Do not take more or less than you are supposed to take. Keep all medicines out of the reach of children. Store medicines according to the directions on the label. Monitor yourself. Learn to know how your body reacts to your new medicine and keep track of how it makes you feel before attempting (If your provider has allowed you to do so) to drive or go to work. Seek emergency medical attention if you think you have used too much of this medicine. An overdose of any prescription medicine can be fatal. Overdose symptoms may include extreme drowsiness, muscle weakness, confusion, cold and clammy skin, pinpoint pupils, shallow breathing, slow heart rate, fainting, or coma. Don't share prescription medicines with others, even when they seem to have the same symptoms. What may be good for you may be harmful to others. If you are no longer taking a prescribed medication and you have pills left please take your pills out of their original containers. Mix crushed pills with an undesirable substance, such as cat litter or used coffee grounds.  Put the mixture into a disposable container with a lid, such as an empty

## 2023-03-28 ENCOUNTER — TELEPHONE (OUTPATIENT)
Dept: PRIMARY CARE CLINIC | Age: 33
End: 2023-03-28

## 2023-03-28 DIAGNOSIS — F32.A ANXIETY AND DEPRESSION: Primary | ICD-10-CM

## 2023-03-28 DIAGNOSIS — F41.9 ANXIETY AND DEPRESSION: Primary | ICD-10-CM

## 2023-03-28 RX ORDER — SERTRALINE HYDROCHLORIDE 25 MG/1
25 TABLET, FILM COATED ORAL DAILY
Qty: 30 TABLET | Refills: 0 | Status: SHIPPED | OUTPATIENT
Start: 2023-03-28

## 2023-03-28 NOTE — TELEPHONE ENCOUNTER
Pt called stated she wants anxiety medications. James Miller stated she will send it to   [facility], would like for her to follow up in two weeks and confirm with her OB.

## 2023-04-03 ENCOUNTER — ROUTINE PRENATAL (OUTPATIENT)
Dept: OBSTETRICS AND GYNECOLOGY | Facility: CLINIC | Age: 33
End: 2023-04-03
Payer: COMMERCIAL

## 2023-04-03 VITALS — DIASTOLIC BLOOD PRESSURE: 74 MMHG | SYSTOLIC BLOOD PRESSURE: 110 MMHG | WEIGHT: 148 LBS | BODY MASS INDEX: 25.58 KG/M2

## 2023-04-03 DIAGNOSIS — Z67.91 RH NEGATIVE, ANTEPARTUM: ICD-10-CM

## 2023-04-03 DIAGNOSIS — Z34.92 PRENATAL CARE IN SECOND TRIMESTER: Primary | ICD-10-CM

## 2023-04-03 DIAGNOSIS — O09.529 ANTEPARTUM MULTIGRAVIDA OF ADVANCED MATERNAL AGE: ICD-10-CM

## 2023-04-03 DIAGNOSIS — Z36.89 ENCOUNTER FOR FETAL ANATOMIC SURVEY: ICD-10-CM

## 2023-04-03 DIAGNOSIS — O26.899 RH NEGATIVE, ANTEPARTUM: ICD-10-CM

## 2023-04-03 PROCEDURE — 99213 OFFICE O/P EST LOW 20 MIN: CPT | Performed by: OBSTETRICS & GYNECOLOGY

## 2023-04-03 RX ORDER — SERTRALINE HYDROCHLORIDE 25 MG/1
1 TABLET, FILM COATED ORAL DAILY
COMMUNITY
Start: 2023-03-28

## 2023-04-03 NOTE — PROGRESS NOTES
Prenatal Care Visit    Subjective   Chief Complaint   Patient presents with   • Routine Prenatal Visit     Anatomy scan, no complaints.       History:   Ines is a  currently at 18w0d who presents for a prenatal care visit today.    No issues.    Social History    Tobacco Use      Smoking status: Never      Smokeless tobacco: Never      Tobacco comments: Nn       Objective   /74   Wt 67.1 kg (148 lb)   LMP 2022 (Approximate)   BMI 25.58 kg/m²   Physical Exam:  Normal, gestational age-appropriate exam today        Plan   Medical Decision Making:    I have reviewed the prenatal labs and ultrasound(s) today. I have reviewed the most recent prenatal progress note(s).    Diagnosis: Supervision of low risk pregnancy   AMA  Rh NEG   Tests/Orders/Rx today: No orders of the defined types were placed in this encounter.      Medication Management: none     Topics discussed: Prenatal care milestones  U/S findings   No NIPS testing   Tests next visit: none   Next visit: 4 week(s)     Rishi Torres MD  Obstetrics and Gynecology  [facility]

## 2023-04-21 ENCOUNTER — OFFICE VISIT (OUTPATIENT)
Dept: PRIMARY CARE CLINIC | Age: 33
End: 2023-04-21

## 2023-04-21 VITALS
RESPIRATION RATE: 18 BRPM | TEMPERATURE: 98 F | HEIGHT: 64 IN | BODY MASS INDEX: 25.1 KG/M2 | WEIGHT: 147 LBS | OXYGEN SATURATION: 98 % | DIASTOLIC BLOOD PRESSURE: 60 MMHG | SYSTOLIC BLOOD PRESSURE: 101 MMHG | HEART RATE: 70 BPM

## 2023-04-21 DIAGNOSIS — F32.A ANXIETY AND DEPRESSION: Primary | ICD-10-CM

## 2023-04-21 DIAGNOSIS — F41.9 ANXIETY AND DEPRESSION: Primary | ICD-10-CM

## 2023-04-21 DIAGNOSIS — Z3A.21 21 WEEKS GESTATION OF PREGNANCY: ICD-10-CM

## 2023-04-21 RX ORDER — SERTRALINE HYDROCHLORIDE 25 MG/1
50 TABLET, FILM COATED ORAL DAILY
Qty: 30 TABLET | Refills: 3 | Status: SHIPPED | OUTPATIENT
Start: 2023-04-21 | End: 2023-04-21

## 2023-04-21 ASSESSMENT — ENCOUNTER SYMPTOMS
ABDOMINAL PAIN: 0
SHORTNESS OF BREATH: 0
EYE PAIN: 0
SORE THROAT: 0
DIARRHEA: 0
CONSTIPATION: 0
COUGH: 0

## 2023-04-22 DIAGNOSIS — F32.A ANXIETY AND DEPRESSION: ICD-10-CM

## 2023-04-22 DIAGNOSIS — F41.9 ANXIETY AND DEPRESSION: ICD-10-CM

## 2023-04-24 RX ORDER — SERTRALINE HYDROCHLORIDE 25 MG/1
25 TABLET, FILM COATED ORAL DAILY
Qty: 30 TABLET | Refills: 0 | OUTPATIENT
Start: 2023-04-24

## 2023-05-08 ENCOUNTER — ROUTINE PRENATAL (OUTPATIENT)
Dept: OBSTETRICS AND GYNECOLOGY | Facility: CLINIC | Age: 33
End: 2023-05-08
Payer: COMMERCIAL

## 2023-05-08 VITALS — DIASTOLIC BLOOD PRESSURE: 62 MMHG | SYSTOLIC BLOOD PRESSURE: 112 MMHG | WEIGHT: 150 LBS | BODY MASS INDEX: 25.93 KG/M2

## 2023-05-08 DIAGNOSIS — K21.9 GASTROESOPHAGEAL REFLUX DISEASE WITHOUT ESOPHAGITIS: ICD-10-CM

## 2023-05-08 DIAGNOSIS — O09.529 ANTEPARTUM MULTIGRAVIDA OF ADVANCED MATERNAL AGE: ICD-10-CM

## 2023-05-08 DIAGNOSIS — O26.899 RH NEGATIVE, ANTEPARTUM: ICD-10-CM

## 2023-05-08 DIAGNOSIS — O09.92 ENCOUNTER FOR SUPERVISION OF HIGH RISK PREGNANCY IN SECOND TRIMESTER, ANTEPARTUM: Primary | ICD-10-CM

## 2023-05-08 DIAGNOSIS — Z67.91 RH NEGATIVE, ANTEPARTUM: ICD-10-CM

## 2023-05-08 DIAGNOSIS — F41.8 ANXIETY WITH DEPRESSION: ICD-10-CM

## 2023-05-08 NOTE — PROGRESS NOTES
Chief Complaint  Routine Prenatal Visit (No complaints. )    History of Present Illness:  Ines is a  currently at 23w0d who presents today with no significant complaints.  Patient does report good fetal movement.  Patient has been taking her Zoloft 50 mg daily.  She does report doing well at present.  Patient denies any vaginal bleeding or spotting at present.  She denies any significant cramping.  She did have scan for anatomy last visit as noted.  She does have reflux as well.    Exam:  Vitals:  See prenatal flowsheet as noted and reviewed  General: Alert, cooperative, and does not appear in any distress  Abdomen:   See prenatal flowsheet as noted and reviewed    Uterus gravid, non-tender; no palpable masses    No guarding or rebound tenderness  Pelvic:  See prenatal flowsheet as noted and reviewed  Ext:  See prenatal flowsheet as noted and reviewed    Moves extremities well, no cyanosis and no redness  Urine:  See prenatal flowsheet as noted and reviewed    Data Review:  The following data was reviewed by: Zuleika Sinclair MD on 2023:  Prenatal Labs:  Lab Results   Component Value Date    HGB 11.9 2023    RUBELLAABIGG 1.13 2023    HEPBSAG Negative 2023    ABO O 2023    RH Negative 2023    ABSCRN Negative 2023    [id number] Non Reactive 2023    HEPCVIRUSABY <0.1 2023    STREPGPB Positive (A) 2020       No visits with results within 1 Month(s) from this visit.   Latest known visit with results is:   Initial Prenatal on 2023   Component Date Value   • Hepatitis B Surface Ag 2023 Negative    • Hep C Virus Ab 2023 <0.1    • RPR 2023 Non Reactive    • Rubella Antibodies, IgG 2023 1.13    • ABO Type 2023 O    • Rh Factor 2023 Negative    • Antibody Screen 2023 Negative    • HIV Screen 4th Gen w/RFX* 2023 Non Reactive    • WBC 2023 7.8    • RBC 2023 4.38    • Hemoglobin 2023 13.2    •  Hematocrit 2023 39.5    • MCV 2023 90    • MCH 2023 30.1    • MCHC 2023 33.4    • RDW 2023 13.3    • Platelets 2023 203    • Neutrophil Rel % 2023 66    • Lymphocyte Rel % 2023 25    • Monocyte Rel % 2023 7    • Eosinophil Rel % 2023 0    • Basophil Rel % 2023 1    • Neutrophils Absolute 2023 5.2    • Lymphocytes Absolute 2023 2.0    • Monocytes Absolute 2023 0.5    • Eosinophils Absolute 2023 0.0    • Basophils Absolute 2023 0.0    • Immature Granulocyte Rel* 2023 1    • Immature Grans Absolute 2023 0.0    • Reference Lab Report 2023                      Value:[facility]  [address] [place] [address]  Phone: [phone] or [phone]  Fax: [phone]  French Dent M.D., Medical Director    PATIENT NAME                           LABORATORY NO.  429  ALVA JOSHUA                       [id number]  [id number]                         AGE              SEX  SSN           CLIENT REF #  [facility]L                32      1990  F    [id number]   [id number]    [address] [facility] 3  REQUESTING M.D.     ATTENDING M.D.     COPY TO.  [place] [address]                 DATE COLLECTED      DATE RECEIVED      DATE REPORTED  2023    ThinPrep Pap with Cytyc Imaging    DIAGNOSIS:  Negative for intraepithelial lesion or malignancy    Multiple factors can influence accuracy of Pap tests; therefore, screening at  regular intervals is necessary for early cancer detection.    COMMENT:                            Benign cellular changes associated with reactive/reparative changes  are present.  Professional interpretation rendered by Eyal Cardoza M.D., F.C.A.P. at [facility]  [employer], [employer], [address] [place] [address].  SPECIMEN ADEQUACY:  SATISFACTORY  FOR EVALUATION  Transformation zone is present.  SOURCE OF SPECIMEN:  CERVICAL  SLIDES:  1  CLINICAL HISTORY:  A Routine  Pregnancy, unspecified gestational age    Chlamydia / Gonorrhea  CHLAMYDIA TRACHOMATIS: Negative  NEISSERIA GONORRHOEAE: Negative    The Aptima Combo 2 assay is a target amplification nucleic acid probe test that  utilizes target capture for the in vitro qualitative detection and differentiation of  ribosomal RNA from Chlamydia trachomatis and Neisseria gonorrhoeae to aid  in the diagnosis of chlamdial and gonococcal disease using the [place] system.    CYTOTECHNOLOGIST:      BON RODRIGUEZ(ASCP)                             REVIEWED, DIAGNOSED AND  ELECTRONICALLY SIGNED BY:      Eyal Cardoza M.D., F.C.A.P.    CPT CODES:  65014,                           44290, 68864, 40944     • Amphetamine, Urine Qual 2023 Negative    • Barbiturates Screen, Uri* 2023 Negative    • Benzodiazepine Screen, U* 2023 Negative    • THC Screen, Urine 2023 Negative    • Cocaine Screen, Urine 2023 Negative    • Opiate Screen, Urine 2023 Negative    • Oxycodone/Oxymorphone, U* 2023 Negative    • Phencyclidine (PCP), Uri* 2023 Negative    • Methadone Screen, Urine 2023 Negative    • Propoxyphene Screen 2023 Negative    • Creatinine, Urine 2023 60.3    • pH, UA 2023 7.4    • Please note 2023 Comment      Imaging:  US Ob 14 + Weeks Single or First Gestation  Impression:   IUP at 18+0 weeks. Anatomy was completely cleared today and all organ   systems were found to be normal in appearance. EFW 236g(67%) AC 57%.   Breech presentation. Placenta is anterior. Amniotic fluid and fetal heart   rate are normal.    Rishi Torres MD  Obstetrics and Gynecology  [facility]     Medical Records:  None    Assessment and Plan:  Problem List Items Addressed This Visit        Gravid and     Rh negative, antepartum  Antibody screen next visit.   RhoGAM at 28 weeks.    Relevant Orders    Antibody Screen    Antepartum multigravida of advanced maternal age   Other Visit Diagnoses     Encounter for supervision of high risk pregnancy in second trimester, antepartum    -  Primary  Topics discussed:     GERD management  kick counts and fetal movement  PIH precautions   labor signs and symptoms  Labs next visit as noted    Relevant Orders    CBC (No Diff)    Antibody Screen    Gestational Screen 1 Hr ([facility])    Gastroesophageal reflux disease without esophagitis      Patient is to call if worsening of her symptoms.  Instructions and precautions have been given.  Patient may use Tums as discussed.    Anxiety with depression      She is to continue her Zoloft as discussed.        Follow Up/Instructions:  Follow up as scheduled.  Patient was given instructions and counseling regarding her condition or for health maintenance advice. Please see specific information pulled into the AVS if appropriate.     Note: Speech recognition transcription software may have been used to dictate portions of this document.  An attempt at proofreading has been made though minor errors in transcription may still be present.    This note was electronically signed.  Zuleika Sinclair M.D.

## 2023-05-25 ENCOUNTER — ROUTINE PRENATAL (OUTPATIENT)
Dept: OBSTETRICS AND GYNECOLOGY | Facility: CLINIC | Age: 33
End: 2023-05-25
Payer: COMMERCIAL

## 2023-05-25 VITALS — SYSTOLIC BLOOD PRESSURE: 100 MMHG | WEIGHT: 151.8 LBS | DIASTOLIC BLOOD PRESSURE: 64 MMHG | BODY MASS INDEX: 26.24 KG/M2

## 2023-05-25 DIAGNOSIS — Z34.92 SECOND TRIMESTER PREGNANCY: Primary | ICD-10-CM

## 2023-05-25 NOTE — PROGRESS NOTES
Chief Complaint   Patient presents with   • Routine Prenatal Visit     Prenatal visit with Glucola done today. No problems or concerns.        HPI:   , 25w3d gestation reports doing well    ROS:  See Prenatal Episode/Flowsheet  /64   Wt 68.9 kg (151 lb 12.8 oz)   LMP 2022 (Approximate)   BMI 26.24 kg/m²      EXAM:  EXTREMITIES:  No swelling-See Prenatal Episode/Flowsheet    ABDOMEN:  FHTs/Movement noted-See Prenatal Episode/Flowsheet    URINE GLUCOSE/PROTEIN:  See Prenatal Episode/Flowsheet    PELVIC EXAM:  See Prenatal Episode/Flowsheet  CV:  Lungs:  GYN:    MDM:    Lab Results   Component Value Date    HGB 11.9 2023    RUBELLAABIGG 1.13 2023    HEPBSAG Negative 2023    ABO O 2023    RH Negative 2023    ABSCRN Negative 2023    [id number] Non Reactive 2023    HEPCVIRUSABY <0.1 2023    STREPGPB Positive (A) 2020       U/S:US Ob 14 + Weeks Single or First Gestation (2023 08:43)      1. IUP 25w3d  2. Routine care   3. Rhogam next time  4. glucola today

## 2023-05-26 LAB
BLD GP AB SCN SERPL QL: NEGATIVE
ERYTHROCYTE [DISTWIDTH] IN BLOOD BY AUTOMATED COUNT: 13.4 % (ref 12.3–15.4)
GLUCOSE 1H P 50 G GLC PO SERPL-MCNC: 103 MG/DL (ref 65–139)
HCT VFR BLD AUTO: 31.6 % (ref 34–46.6)
HGB BLD-MCNC: 10.8 G/DL (ref 12–15.9)
MCH RBC QN AUTO: 31.2 PG (ref 26.6–33)
MCHC RBC AUTO-ENTMCNC: 34.2 G/DL (ref 31.5–35.7)
MCV RBC AUTO: 91.3 FL (ref 79–97)
PLATELET # BLD AUTO: 164 10*3/MM3 (ref 140–450)
RBC # BLD AUTO: 3.46 10*6/MM3 (ref 3.77–5.28)
WBC # BLD AUTO: 7.22 10*3/MM3 (ref 3.4–10.8)

## 2023-05-30 RX ORDER — FERROUS SULFATE 325(65) MG
325 TABLET ORAL
Qty: 30 TABLET | Refills: 5 | Status: SHIPPED | OUTPATIENT
Start: 2023-05-30 | End: 2023-06-29

## 2023-06-12 ENCOUNTER — TELEPHONE (OUTPATIENT)
Dept: OBSTETRICS AND GYNECOLOGY | Facility: CLINIC | Age: 33
End: 2023-06-12
Payer: COMMERCIAL

## 2023-06-12 NOTE — TELEPHONE ENCOUNTER
Patient called today stating that since starting the iron medication she has had an increase in nausea, vomiting, diarrhea and abdominal pain. These symptoms are causing patient to wake up 10-20 times nightly to go to go to the bathroom.

## 2023-06-12 NOTE — TELEPHONE ENCOUNTER
Have patient try taking the iron just 1 pill every other day and see how that does.  GI side effects are very very common with all iron supplementation.

## 2023-07-31 ENCOUNTER — ROUTINE PRENATAL (OUTPATIENT)
Dept: OBSTETRICS AND GYNECOLOGY | Facility: CLINIC | Age: 33
End: 2023-07-31
Payer: COMMERCIAL

## 2023-07-31 VITALS — SYSTOLIC BLOOD PRESSURE: 118 MMHG | WEIGHT: 155 LBS | BODY MASS INDEX: 26.79 KG/M2 | DIASTOLIC BLOOD PRESSURE: 68 MMHG

## 2023-07-31 DIAGNOSIS — O09.529 ANTEPARTUM MULTIGRAVIDA OF ADVANCED MATERNAL AGE: Primary | ICD-10-CM

## 2023-08-07 ENCOUNTER — ROUTINE PRENATAL (OUTPATIENT)
Dept: OBSTETRICS AND GYNECOLOGY | Facility: CLINIC | Age: 33
End: 2023-08-07
Payer: COMMERCIAL

## 2023-08-07 VITALS — WEIGHT: 155.4 LBS | DIASTOLIC BLOOD PRESSURE: 60 MMHG | BODY MASS INDEX: 26.86 KG/M2 | SYSTOLIC BLOOD PRESSURE: 100 MMHG

## 2023-08-07 DIAGNOSIS — Z36.85 ENCOUNTER FOR ANTENATAL SCREENING FOR STREPTOCOCCUS B: Primary | ICD-10-CM

## 2023-08-07 DIAGNOSIS — O36.63X0 LGA (LARGE FOR GESTATIONAL AGE) FETUS AFFECTING MANAGEMENT OF MOTHER, THIRD TRIMESTER, NOT APPLICABLE OR UNSPECIFIED FETUS: ICD-10-CM

## 2023-08-07 PROCEDURE — 99213 OFFICE O/P EST LOW 20 MIN: CPT | Performed by: OBSTETRICS & GYNECOLOGY

## 2023-08-07 NOTE — PROGRESS NOTES
Chief Complaint   Patient presents with    Routine Prenatal Visit     Prenatal visit with Growth scan and GBS done today. No problems or concerns.        HPI:   , 36w0d gestation reports doing well    ROS:  See Prenatal Episode/Flowsheet  /60   Wt 70.5 kg (155 lb 6.4 oz)   LMP 2022 (Approximate)   BMI 26.86 kg/mý      EXAM:  EXTREMITIES:  No swelling-See Prenatal Episode/Flowsheet    ABDOMEN:  FHTs/Movement noted-See Prenatal Episode/Flowsheet    URINE GLUCOSE/PROTEIN:  See Prenatal Episode/Flowsheet    PELVIC EXAM:  See Prenatal Episode/Flowsheet  CV:  Lungs:  GYN:    MDM:    Lab Results   Component Value Date    HGB 10.8 (L) 2023    RUBELLAABIGG 1.13 2023    HEPBSAG Negative 2023    ABO O 2023    RH Negative 2023    ABSCRN Negative 2023    EQH3HPI5 Non Reactive 2023    HEPCVIRUSABY <0.1 2023    STREPGPB Positive (A) 2020       U/S: Normal growth 97th percentile.  8 pounds 2 ounces.  LUIS FERNANDO 12.9.  Symmetric.  Vertex.  Anterior placenta    1. IUP 36w0d  2. Routine care   3.  LGA: Patient proven to 8 pounds 9 ounces-ultrasound percentile was consistent with 32-week ultrasound.  4.  GBS done  5.  Anemia: Patient taking vitamins and FE every other day

## 2023-08-12 LAB
CLINDAMYCIN ISLT KB: ABNORMAL
GP B STREP DNA SPEC QL NAA+PROBE: POSITIVE
ORGANISM ID: ABNORMAL

## 2023-08-14 ENCOUNTER — ROUTINE PRENATAL (OUTPATIENT)
Dept: OBSTETRICS AND GYNECOLOGY | Facility: CLINIC | Age: 33
End: 2023-08-14
Payer: COMMERCIAL

## 2023-08-14 VITALS — DIASTOLIC BLOOD PRESSURE: 58 MMHG | SYSTOLIC BLOOD PRESSURE: 106 MMHG | WEIGHT: 154 LBS | BODY MASS INDEX: 26.62 KG/M2

## 2023-08-14 DIAGNOSIS — O36.63X0 LGA (LARGE FOR GESTATIONAL AGE) FETUS AFFECTING MANAGEMENT OF MOTHER, THIRD TRIMESTER, NOT APPLICABLE OR UNSPECIFIED FETUS: ICD-10-CM

## 2023-08-14 DIAGNOSIS — F41.8 ANXIETY WITH DEPRESSION: ICD-10-CM

## 2023-08-14 DIAGNOSIS — O09.529 ANTEPARTUM MULTIGRAVIDA OF ADVANCED MATERNAL AGE: ICD-10-CM

## 2023-08-14 DIAGNOSIS — O99.820 GBS (GROUP B STREPTOCOCCUS CARRIER), +RV CULTURE, CURRENTLY PREGNANT: ICD-10-CM

## 2023-08-14 DIAGNOSIS — Z34.93 PRENATAL CARE IN THIRD TRIMESTER: Primary | ICD-10-CM

## 2023-08-14 DIAGNOSIS — Z67.91 RH NEGATIVE STATE IN ANTEPARTUM PERIOD, THIRD TRIMESTER: ICD-10-CM

## 2023-08-14 DIAGNOSIS — O99.019 MATERNAL ANEMIA IN PREGNANCY, ANTEPARTUM: ICD-10-CM

## 2023-08-14 DIAGNOSIS — O26.893 RH NEGATIVE STATE IN ANTEPARTUM PERIOD, THIRD TRIMESTER: ICD-10-CM

## 2023-08-14 PROCEDURE — 99213 OFFICE O/P EST LOW 20 MIN: CPT | Performed by: STUDENT IN AN ORGANIZED HEALTH CARE EDUCATION/TRAINING PROGRAM

## 2023-08-14 NOTE — PROGRESS NOTES
Prenatal Care Visit    Subjective   Chief Complaint   Patient presents with    Routine Prenatal Visit     History:   Ines is a  currently at 37w0d who presents for a prenatal care visit today.    Reports irregular CTX. Denies VB, LOF. Reports (+) FM.     Objective   /58   Wt 69.9 kg (154 lb)   LMP 2022 (Approximate)   BMI 26.62 kg/mý   Physical Exam:  Normal, gestational age-appropriate exam today   CVX: /-3, midposition, medium consistency      Assessment & Plan     IUP @ 37w0d  Routine care: I have reviewed the prenatal labs and ultrasound(s) today. I have reviewed the most recent prenatal progress note(s). GBS (+).  LGA: EFW 3687g (> 97%) with AC > 97.7% at 36 wks, pelvis proven to 3884g  AMA: declined genetic screening, anatomy sono wnl  Rh (-): s/p Rhogam 23  Anemia: continue iron supplement  Anxiety/ depression: stable on zoloft 25mg QD  GBS (+): reports hives with amoxicillin --> plan ancef intrapartum     Diagnosis Plan   1. Prenatal care in third trimester        2. LGA (large for gestational age) fetus affecting management of mother, third trimester, not applicable or unspecified fetus        3. Antepartum multigravida of advanced maternal age        4. Rh negative state in antepartum period, third trimester        5. Maternal anemia in pregnancy, antepartum        6. Anxiety with depression        7. GBS (group B Streptococcus carrier), +RV culture, currently pregnant           Medication Management: Continue PNV, iron, zoloft    Topics discussed: Prenatal care milestones  Kick counts and fetal movement  Labor signs and symptoms  Birth plan  Induction of labor - desires IOL at 39 wks, timing to be determined based on exam next visit   Tests next visit: none   Next visit: 1 week(s)     Xiomara Rose MD  Obstetrics and Gynecology  [facility]

## 2023-08-21 ENCOUNTER — ROUTINE PRENATAL (OUTPATIENT)
Dept: OBSTETRICS AND GYNECOLOGY | Facility: CLINIC | Age: 33
End: 2023-08-21
Payer: COMMERCIAL

## 2023-08-21 ENCOUNTER — PREP FOR SURGERY (OUTPATIENT)
Dept: OTHER | Facility: HOSPITAL | Age: 33
End: 2023-08-21
Payer: COMMERCIAL

## 2023-08-21 VITALS — SYSTOLIC BLOOD PRESSURE: 110 MMHG | BODY MASS INDEX: 26.79 KG/M2 | DIASTOLIC BLOOD PRESSURE: 60 MMHG | WEIGHT: 155 LBS

## 2023-08-21 DIAGNOSIS — Z34.90 ENCOUNTER FOR INDUCTION OF LABOR: Primary | ICD-10-CM

## 2023-08-21 DIAGNOSIS — O09.529 ANTEPARTUM MULTIGRAVIDA OF ADVANCED MATERNAL AGE: Primary | ICD-10-CM

## 2023-08-21 PROCEDURE — 99213 OFFICE O/P EST LOW 20 MIN: CPT | Performed by: MIDWIFE

## 2023-08-21 RX ORDER — OXYTOCIN/0.9 % SODIUM CHLORIDE 30/500 ML
1-20 PLASTIC BAG, INJECTION (ML) INTRAVENOUS
Status: CANCELLED | OUTPATIENT
Start: 2023-08-21

## 2023-08-21 RX ORDER — SODIUM CHLORIDE 9 MG/ML
40 INJECTION, SOLUTION INTRAVENOUS AS NEEDED
Status: CANCELLED | OUTPATIENT
Start: 2023-08-21

## 2023-08-21 RX ORDER — ONDANSETRON 4 MG/1
4 TABLET, FILM COATED ORAL EVERY 6 HOURS PRN
Status: CANCELLED | OUTPATIENT
Start: 2023-08-21

## 2023-08-21 RX ORDER — PROMETHAZINE HYDROCHLORIDE 12.5 MG/1
12.5 TABLET ORAL EVERY 6 HOURS PRN
OUTPATIENT
Start: 2023-08-21

## 2023-08-21 RX ORDER — OXYTOCIN 10 [USP'U]/ML
10 INJECTION, SOLUTION INTRAMUSCULAR; INTRAVENOUS ONCE
OUTPATIENT
Start: 2023-08-21 | End: 2023-08-21

## 2023-08-21 RX ORDER — ONDANSETRON 4 MG/1
4 TABLET, FILM COATED ORAL ONCE AS NEEDED
OUTPATIENT
Start: 2023-08-21

## 2023-08-21 RX ORDER — MORPHINE SULFATE 2 MG/ML
2 INJECTION, SOLUTION INTRAMUSCULAR; INTRAVENOUS ONCE AS NEEDED
OUTPATIENT
Start: 2023-08-21

## 2023-08-21 RX ORDER — MAGNESIUM CARB/ALUMINUM HYDROX 105-160MG
30 TABLET,CHEWABLE ORAL ONCE
Status: CANCELLED | OUTPATIENT
Start: 2023-08-21 | End: 2023-08-21

## 2023-08-21 RX ORDER — METHYLERGONOVINE MALEATE 0.2 MG/ML
200 INJECTION INTRAVENOUS ONCE AS NEEDED
OUTPATIENT
Start: 2023-08-21 | End: 2023-08-22

## 2023-08-21 RX ORDER — PROMETHAZINE HYDROCHLORIDE 12.5 MG/1
12.5 TABLET ORAL EVERY 6 HOURS PRN
Status: CANCELLED | OUTPATIENT
Start: 2023-08-21

## 2023-08-21 RX ORDER — ONDANSETRON 2 MG/ML
4 INJECTION INTRAMUSCULAR; INTRAVENOUS ONCE AS NEEDED
OUTPATIENT
Start: 2023-08-21

## 2023-08-21 RX ORDER — ONDANSETRON 2 MG/ML
4 INJECTION INTRAMUSCULAR; INTRAVENOUS EVERY 6 HOURS PRN
Status: CANCELLED | OUTPATIENT
Start: 2023-08-21

## 2023-08-21 RX ORDER — PROMETHAZINE HYDROCHLORIDE 12.5 MG/1
12.5 SUPPOSITORY RECTAL EVERY 6 HOURS PRN
Status: CANCELLED | OUTPATIENT
Start: 2023-08-21

## 2023-08-21 RX ORDER — LIDOCAINE HYDROCHLORIDE 10 MG/ML
0.5 INJECTION, SOLUTION INFILTRATION; PERINEURAL ONCE AS NEEDED
Status: CANCELLED | OUTPATIENT
Start: 2023-08-21

## 2023-08-21 RX ORDER — SODIUM CHLORIDE 0.9 % (FLUSH) 0.9 %
10 SYRINGE (ML) INJECTION EVERY 12 HOURS SCHEDULED
Status: CANCELLED | OUTPATIENT
Start: 2023-08-21

## 2023-08-21 RX ORDER — SODIUM CHLORIDE 0.9 % (FLUSH) 0.9 %
10 SYRINGE (ML) INJECTION AS NEEDED
Status: CANCELLED | OUTPATIENT
Start: 2023-08-21

## 2023-08-21 RX ORDER — ACETAMINOPHEN 325 MG/1
650 TABLET ORAL ONCE AS NEEDED
OUTPATIENT
Start: 2023-08-21

## 2023-08-21 RX ORDER — CARBOPROST TROMETHAMINE 250 UG/ML
250 INJECTION, SOLUTION INTRAMUSCULAR ONCE AS NEEDED
OUTPATIENT
Start: 2023-08-21 | End: 2023-08-22

## 2023-08-21 RX ORDER — MISOPROSTOL 200 UG/1
800 TABLET ORAL ONCE AS NEEDED
OUTPATIENT
Start: 2023-08-21 | End: 2023-08-22

## 2023-08-21 RX ORDER — HYDROCODONE BITARTRATE AND ACETAMINOPHEN 5; 325 MG/1; MG/1
1 TABLET ORAL EVERY 4 HOURS PRN
OUTPATIENT
Start: 2023-08-21 | End: 2023-08-28

## 2023-08-21 RX ORDER — HYDROXYZINE HYDROCHLORIDE 25 MG/1
50 TABLET, FILM COATED ORAL NIGHTLY PRN
Status: CANCELLED | OUTPATIENT
Start: 2023-08-21

## 2023-08-21 RX ORDER — SODIUM CHLORIDE, SODIUM LACTATE, POTASSIUM CHLORIDE, CALCIUM CHLORIDE 600; 310; 30; 20 MG/100ML; MG/100ML; MG/100ML; MG/100ML
125 INJECTION, SOLUTION INTRAVENOUS CONTINUOUS
Status: CANCELLED | OUTPATIENT
Start: 2023-08-21

## 2023-08-21 RX ORDER — VANCOMYCIN/0.9 % SOD CHLORIDE 1.5G/250ML
20 PLASTIC BAG, INJECTION (ML) INTRAVENOUS EVERY 8 HOURS
Status: CANCELLED | OUTPATIENT
Start: 2023-08-21 | End: 2023-08-23

## 2023-08-21 NOTE — PROGRESS NOTES
Chief Complaint   Patient presents with    Routine Prenatal Visit     No Complaints/concerns        HPI: Ines is a  currently at 38w0d here for prenatal visit who reports the following:  Baby is active. She has had intermittent cramping. She wants an induction at 39 weeks.                EXAM:     Vitals:    23 1135   BP: 110/60      Abdomen:   See prenatal flowsheet as noted and reviewed, soft, nontender   Pelvic:  See prenatal flowsheet as noted and reviewed / posterior soft   Urine:  See prenatal flowsheet as noted and reviewed    Lab Results   Component Value Date    ABO O 2023    RH Negative 2023    ABSCRN Negative 2023       MDM:  Impression: GBS (+)  Rh negative  AMA - declines testing  Anemia in pregnancy   Tests done today: none   Topics discussed: induction of labor  kick counts and fetal movement  labor signs and symptoms  Reviewed OB labs   Tests next visit: none                RTO:                        Low dose Pitocin  @ 10 pm    This note was electronically signed.  Mattie Lee, FIDELINA  2023

## 2023-08-28 ENCOUNTER — HOSPITAL ENCOUNTER (OUTPATIENT)
Dept: LABOR AND DELIVERY | Facility: HOSPITAL | Age: 33
Discharge: HOME | End: 2023-08-28
Payer: COMMERCIAL

## 2023-08-28 ENCOUNTER — HOSPITAL ENCOUNTER (INPATIENT)
Facility: HOSPITAL | Age: 33
LOS: 2 days | Discharge: HOME | End: 2023-08-30
Attending: STUDENT IN AN ORGANIZED HEALTH CARE EDUCATION/TRAINING PROGRAM | Admitting: MIDWIFE
Payer: COMMERCIAL

## 2023-08-28 DIAGNOSIS — Z34.90 ENCOUNTER FOR INDUCTION OF LABOR: ICD-10-CM

## 2023-08-28 LAB
ABO GROUP BLD: NORMAL
BASOPHILS # BLD AUTO: 0.04 10*3/MM3 (ref 0–0.2)
BASOPHILS NFR BLD AUTO: 0.4 % (ref 0–1.5)
BLD GP AB SCN SERPL QL: POSITIVE
DEPRECATED RDW RBC AUTO: 44 FL (ref 37–54)
EOSINOPHIL # BLD AUTO: 0.03 10*3/MM3 (ref 0–0.4)
EOSINOPHIL NFR BLD AUTO: 0.3 % (ref 0.3–6.2)
ERYTHROCYTE [DISTWIDTH] IN BLOOD BY AUTOMATED COUNT: 14 % (ref 12.3–15.4)
HCT VFR BLD AUTO: 31.6 % (ref 34–46.6)
HGB BLD-MCNC: 11.1 G/DL (ref 12–15.9)
IMM GRANULOCYTES # BLD AUTO: 0.05 10*3/MM3 (ref 0–0.05)
IMM GRANULOCYTES NFR BLD AUTO: 0.5 % (ref 0–0.5)
LYMPHOCYTES # BLD AUTO: 1.8 10*3/MM3 (ref 0.7–3.1)
LYMPHOCYTES NFR BLD AUTO: 18 % (ref 19.6–45.3)
MCH RBC QN AUTO: 30.7 PG (ref 26.6–33)
MCHC RBC AUTO-ENTMCNC: 35.1 G/DL (ref 31.5–35.7)
MCV RBC AUTO: 87.5 FL (ref 79–97)
MONOCYTES # BLD AUTO: 0.77 10*3/MM3 (ref 0.1–0.9)
MONOCYTES NFR BLD AUTO: 7.7 % (ref 5–12)
NEUTROPHILS NFR BLD AUTO: 7.29 10*3/MM3 (ref 1.7–7)
NEUTROPHILS NFR BLD AUTO: 73.1 % (ref 42.7–76)
NRBC BLD AUTO-RTO: 0 /100 WBC (ref 0–0.2)
PLATELET # BLD AUTO: 169 10*3/MM3 (ref 140–450)
PMV BLD AUTO: 10.9 FL (ref 6–12)
RBC # BLD AUTO: 3.61 10*6/MM3 (ref 3.77–5.28)
RH BLD: NEGATIVE
T&S EXPIRATION DATE: NORMAL
WBC NRBC COR # BLD: 9.98 10*3/MM3 (ref 3.4–10.8)

## 2023-08-28 PROCEDURE — 86920 COMPATIBILITY TEST SPIN: CPT

## 2023-08-28 PROCEDURE — 86870 RBC ANTIBODY IDENTIFICATION: CPT | Performed by: MIDWIFE

## 2023-08-28 PROCEDURE — 25010000002 VANCOMYCIN 5 G RECONSTITUTED SOLUTION: Performed by: MIDWIFE

## 2023-08-28 PROCEDURE — 86900 BLOOD TYPING SEROLOGIC ABO: CPT | Performed by: MIDWIFE

## 2023-08-28 PROCEDURE — 59025 FETAL NON-STRESS TEST: CPT

## 2023-08-28 PROCEDURE — 59025 FETAL NON-STRESS TEST: CPT | Performed by: STUDENT IN AN ORGANIZED HEALTH CARE EDUCATION/TRAINING PROGRAM

## 2023-08-28 PROCEDURE — G0463 HOSPITAL OUTPT CLINIC VISIT: HCPCS

## 2023-08-28 PROCEDURE — 85025 COMPLETE CBC W/AUTO DIFF WBC: CPT | Performed by: MIDWIFE

## 2023-08-28 PROCEDURE — 86901 BLOOD TYPING SEROLOGIC RH(D): CPT | Performed by: MIDWIFE

## 2023-08-28 PROCEDURE — 86850 RBC ANTIBODY SCREEN: CPT | Performed by: MIDWIFE

## 2023-08-28 PROCEDURE — 86922 COMPATIBILITY TEST ANTIGLOB: CPT

## 2023-08-28 RX ORDER — ONDANSETRON 2 MG/ML
4 INJECTION INTRAMUSCULAR; INTRAVENOUS EVERY 6 HOURS PRN
Status: DISCONTINUED | OUTPATIENT
Start: 2023-08-28 | End: 2023-08-29

## 2023-08-28 RX ORDER — HYDROXYZINE HYDROCHLORIDE 25 MG/1
50 TABLET, FILM COATED ORAL NIGHTLY PRN
Status: DISCONTINUED | OUTPATIENT
Start: 2023-08-28 | End: 2023-08-29

## 2023-08-28 RX ORDER — PROMETHAZINE HYDROCHLORIDE 12.5 MG/1
12.5 SUPPOSITORY RECTAL EVERY 6 HOURS PRN
Status: DISCONTINUED | OUTPATIENT
Start: 2023-08-28 | End: 2023-08-29

## 2023-08-28 RX ORDER — OXYTOCIN/0.9 % SODIUM CHLORIDE 30/500 ML
1-20 PLASTIC BAG, INJECTION (ML) INTRAVENOUS
Status: DISCONTINUED | OUTPATIENT
Start: 2023-08-28 | End: 2023-08-29

## 2023-08-28 RX ORDER — LIDOCAINE HYDROCHLORIDE 10 MG/ML
0.5 INJECTION, SOLUTION INFILTRATION; PERINEURAL ONCE AS NEEDED
Status: DISCONTINUED | OUTPATIENT
Start: 2023-08-28 | End: 2023-08-29

## 2023-08-28 RX ORDER — MAGNESIUM CARB/ALUMINUM HYDROX 105-160MG
30 TABLET,CHEWABLE ORAL ONCE
Status: COMPLETED | OUTPATIENT
Start: 2023-08-28 | End: 2023-08-29

## 2023-08-28 RX ORDER — PROMETHAZINE HYDROCHLORIDE 12.5 MG/1
12.5 TABLET ORAL EVERY 6 HOURS PRN
Status: DISCONTINUED | OUTPATIENT
Start: 2023-08-28 | End: 2023-08-29

## 2023-08-28 RX ORDER — ONDANSETRON 4 MG/1
4 TABLET, FILM COATED ORAL EVERY 6 HOURS PRN
Status: DISCONTINUED | OUTPATIENT
Start: 2023-08-28 | End: 2023-08-29

## 2023-08-28 RX ORDER — VANCOMYCIN/0.9 % SOD CHLORIDE 1.5G/250ML
20 PLASTIC BAG, INJECTION (ML) INTRAVENOUS EVERY 8 HOURS
Status: DISCONTINUED | OUTPATIENT
Start: 2023-08-28 | End: 2023-08-29

## 2023-08-28 RX ORDER — SODIUM CHLORIDE 9 MG/ML
40 INJECTION, SOLUTION INTRAVENOUS AS NEEDED
Status: DISCONTINUED | OUTPATIENT
Start: 2023-08-28 | End: 2023-08-29

## 2023-08-28 RX ORDER — SODIUM CHLORIDE 0.9 % (FLUSH) 0.9 %
10 SYRINGE (ML) INJECTION AS NEEDED
Status: DISCONTINUED | OUTPATIENT
Start: 2023-08-28 | End: 2023-08-29

## 2023-08-28 RX ORDER — SODIUM CHLORIDE 0.9 % (FLUSH) 0.9 %
10 SYRINGE (ML) INJECTION EVERY 12 HOURS SCHEDULED
Status: DISCONTINUED | OUTPATIENT
Start: 2023-08-28 | End: 2023-08-29

## 2023-08-28 RX ORDER — MORPHINE SULFATE 2 MG/ML
6 INJECTION, SOLUTION INTRAMUSCULAR; INTRAVENOUS EVERY 4 HOURS PRN
Status: DISCONTINUED | OUTPATIENT
Start: 2023-08-28 | End: 2023-08-29

## 2023-08-28 RX ORDER — SODIUM CHLORIDE, SODIUM LACTATE, POTASSIUM CHLORIDE, CALCIUM CHLORIDE 600; 310; 30; 20 MG/100ML; MG/100ML; MG/100ML; MG/100ML
125 INJECTION, SOLUTION INTRAVENOUS CONTINUOUS
Status: DISCONTINUED | OUTPATIENT
Start: 2023-08-28 | End: 2023-08-29

## 2023-08-28 RX ORDER — MORPHINE SULFATE 4 MG/ML
4 INJECTION, SOLUTION INTRAMUSCULAR; INTRAVENOUS EVERY 4 HOURS PRN
Status: DISCONTINUED | OUTPATIENT
Start: 2023-08-28 | End: 2023-08-29

## 2023-08-28 RX ADMIN — VANCOMYCIN HYDROCHLORIDE 1500 MG: 500 INJECTION, POWDER, LYOPHILIZED, FOR SOLUTION INTRAVENOUS at 23:31

## 2023-08-28 RX ADMIN — SODIUM CHLORIDE, POTASSIUM CHLORIDE, SODIUM LACTATE AND CALCIUM CHLORIDE 125 ML/HR: 600; 310; 30; 20 INJECTION, SOLUTION INTRAVENOUS at 23:00

## 2023-08-28 RX ADMIN — Medication 1 MILLI-UNITS/MIN: at 23:31

## 2023-08-29 ENCOUNTER — ANESTHESIA (OUTPATIENT)
Dept: LABOR AND DELIVERY | Facility: HOSPITAL | Age: 33
End: 2023-08-29
Payer: COMMERCIAL

## 2023-08-29 ENCOUNTER — ANESTHESIA EVENT (OUTPATIENT)
Dept: LABOR AND DELIVERY | Facility: HOSPITAL | Age: 33
End: 2023-08-29
Payer: COMMERCIAL

## 2023-08-29 LAB
ANION GAP SERPL CALCULATED.3IONS-SCNC: 11 MMOL/L (ref 5–15)
ANTI-D: NORMAL
BUN SERPL-MCNC: 5 MG/DL (ref 6–20)
BUN/CREAT SERPL: 8.9 (ref 7–25)
CALCIUM SPEC-SCNC: 8.2 MG/DL (ref 8.6–10.5)
CHLORIDE SERPL-SCNC: 106 MMOL/L (ref 98–107)
CO2 SERPL-SCNC: 20 MMOL/L (ref 22–29)
CREAT SERPL-MCNC: 0.56 MG/DL (ref 0.57–1)
EGFRCR SERPLBLD CKD-EPI 2021: 122.2 ML/MIN/1.73
GLUCOSE SERPL-MCNC: 77 MG/DL (ref 65–99)
POTASSIUM SERPL-SCNC: 3.7 MMOL/L (ref 3.5–5.2)
SODIUM SERPL-SCNC: 137 MMOL/L (ref 136–145)

## 2023-08-29 PROCEDURE — 25010000002 VANCOMYCIN 5 G RECONSTITUTED SOLUTION: Performed by: MIDWIFE

## 2023-08-29 PROCEDURE — 63710000001 DIPHENHYDRAMINE PER 50 MG: Performed by: MIDWIFE

## 2023-08-29 PROCEDURE — 59410 OBSTETRICAL CARE: CPT | Performed by: MIDWIFE

## 2023-08-29 PROCEDURE — 51703 INSERT BLADDER CATH COMPLEX: CPT

## 2023-08-29 PROCEDURE — 80048 BASIC METABOLIC PNL TOTAL CA: CPT | Performed by: MIDWIFE

## 2023-08-29 PROCEDURE — C1755 CATHETER, INTRASPINAL: HCPCS | Performed by: NURSE ANESTHETIST, CERTIFIED REGISTERED

## 2023-08-29 PROCEDURE — 0HQ9XZZ REPAIR PERINEUM SKIN, EXTERNAL APPROACH: ICD-10-PCS | Performed by: MIDWIFE

## 2023-08-29 RX ORDER — PROMETHAZINE HYDROCHLORIDE 12.5 MG/1
12.5 SUPPOSITORY RECTAL EVERY 6 HOURS PRN
Status: DISCONTINUED | OUTPATIENT
Start: 2023-08-29 | End: 2023-08-30 | Stop reason: HOSPADM

## 2023-08-29 RX ORDER — ONDANSETRON 2 MG/ML
4 INJECTION INTRAMUSCULAR; INTRAVENOUS ONCE AS NEEDED
Status: DISCONTINUED | OUTPATIENT
Start: 2023-08-29 | End: 2023-08-29

## 2023-08-29 RX ORDER — DIPHENHYDRAMINE HCL 25 MG
25 CAPSULE ORAL ONCE
Status: COMPLETED | OUTPATIENT
Start: 2023-08-29 | End: 2023-08-29

## 2023-08-29 RX ORDER — ONDANSETRON 4 MG/1
4 TABLET, FILM COATED ORAL ONCE AS NEEDED
Status: DISCONTINUED | OUTPATIENT
Start: 2023-08-29 | End: 2023-08-29

## 2023-08-29 RX ORDER — ACETAMINOPHEN 500 MG
1000 TABLET ORAL EVERY 8 HOURS SCHEDULED
Status: DISCONTINUED | OUTPATIENT
Start: 2023-08-29 | End: 2023-08-30 | Stop reason: HOSPADM

## 2023-08-29 RX ORDER — HYDROCODONE BITARTRATE AND ACETAMINOPHEN 5; 325 MG/1; MG/1
1 TABLET ORAL EVERY 4 HOURS PRN
Status: DISCONTINUED | OUTPATIENT
Start: 2023-08-29 | End: 2023-08-29

## 2023-08-29 RX ORDER — IBUPROFEN 800 MG/1
800 TABLET ORAL EVERY 8 HOURS
Status: DISCONTINUED | OUTPATIENT
Start: 2023-08-29 | End: 2023-08-30 | Stop reason: HOSPADM

## 2023-08-29 RX ORDER — MORPHINE SULFATE 4 MG/ML
4 INJECTION, SOLUTION INTRAMUSCULAR; INTRAVENOUS ONCE AS NEEDED
Status: DISCONTINUED | OUTPATIENT
Start: 2023-08-29 | End: 2023-08-29

## 2023-08-29 RX ORDER — CARBOPROST TROMETHAMINE 250 UG/ML
250 INJECTION, SOLUTION INTRAMUSCULAR ONCE AS NEEDED
Status: DISCONTINUED | OUTPATIENT
Start: 2023-08-29 | End: 2023-08-29

## 2023-08-29 RX ORDER — HYDROCORTISONE 25 MG/G
CREAM TOPICAL AS NEEDED
Status: DISCONTINUED | OUTPATIENT
Start: 2023-08-29 | End: 2023-08-30 | Stop reason: HOSPADM

## 2023-08-29 RX ORDER — OXYTOCIN 10 [USP'U]/ML
10 INJECTION, SOLUTION INTRAMUSCULAR; INTRAVENOUS ONCE
Status: DISCONTINUED | OUTPATIENT
Start: 2023-08-29 | End: 2023-08-29

## 2023-08-29 RX ORDER — MORPHINE SULFATE 2 MG/ML
2 INJECTION, SOLUTION INTRAMUSCULAR; INTRAVENOUS ONCE AS NEEDED
Status: DISCONTINUED | OUTPATIENT
Start: 2023-08-29 | End: 2023-08-29

## 2023-08-29 RX ORDER — PROMETHAZINE HYDROCHLORIDE 12.5 MG/1
25 TABLET ORAL EVERY 6 HOURS PRN
Status: DISCONTINUED | OUTPATIENT
Start: 2023-08-29 | End: 2023-08-30 | Stop reason: HOSPADM

## 2023-08-29 RX ORDER — ONDANSETRON 4 MG/1
4 TABLET, FILM COATED ORAL EVERY 8 HOURS PRN
Status: DISCONTINUED | OUTPATIENT
Start: 2023-08-29 | End: 2023-08-30 | Stop reason: HOSPADM

## 2023-08-29 RX ORDER — EPHEDRINE SULFATE 5 MG/ML
10 INJECTION INTRAVENOUS
Status: DISCONTINUED | OUTPATIENT
Start: 2023-08-29 | End: 2023-08-29

## 2023-08-29 RX ORDER — DOCUSATE SODIUM 100 MG/1
100 CAPSULE, LIQUID FILLED ORAL 2 TIMES DAILY PRN
Status: DISCONTINUED | OUTPATIENT
Start: 2023-08-29 | End: 2023-08-30 | Stop reason: HOSPADM

## 2023-08-29 RX ORDER — HYDROCODONE BITARTRATE AND ACETAMINOPHEN 5; 325 MG/1; MG/1
1 TABLET ORAL EVERY 4 HOURS PRN
Status: DISCONTINUED | OUTPATIENT
Start: 2023-08-29 | End: 2023-08-30 | Stop reason: HOSPADM

## 2023-08-29 RX ORDER — HYDROCODONE BITARTRATE AND ACETAMINOPHEN 10; 325 MG/1; MG/1
1 TABLET ORAL EVERY 4 HOURS PRN
Status: DISCONTINUED | OUTPATIENT
Start: 2023-08-29 | End: 2023-08-30 | Stop reason: HOSPADM

## 2023-08-29 RX ORDER — PRENATAL VIT/IRON FUM/FOLIC AC 27MG-0.8MG
1 TABLET ORAL DAILY
Status: DISCONTINUED | OUTPATIENT
Start: 2023-08-30 | End: 2023-08-30 | Stop reason: HOSPADM

## 2023-08-29 RX ORDER — DIPHENHYDRAMINE HCL 25 MG
25 CAPSULE ORAL NIGHTLY PRN
Status: DISCONTINUED | OUTPATIENT
Start: 2023-08-29 | End: 2023-08-30 | Stop reason: HOSPADM

## 2023-08-29 RX ORDER — METHYLERGONOVINE MALEATE 0.2 MG/ML
200 INJECTION INTRAVENOUS ONCE AS NEEDED
Status: DISCONTINUED | OUTPATIENT
Start: 2023-08-29 | End: 2023-08-29

## 2023-08-29 RX ORDER — PROMETHAZINE HYDROCHLORIDE 12.5 MG/1
12.5 TABLET ORAL EVERY 6 HOURS PRN
Status: DISCONTINUED | OUTPATIENT
Start: 2023-08-29 | End: 2023-08-29

## 2023-08-29 RX ORDER — BISACODYL 10 MG
10 SUPPOSITORY, RECTAL RECTAL DAILY PRN
Status: DISCONTINUED | OUTPATIENT
Start: 2023-08-30 | End: 2023-08-30 | Stop reason: HOSPADM

## 2023-08-29 RX ORDER — NITROFURANTOIN 25; 75 MG/1; MG/1
100 CAPSULE ORAL ONCE
Status: DISCONTINUED | OUTPATIENT
Start: 2023-08-29 | End: 2023-08-29

## 2023-08-29 RX ORDER — MISOPROSTOL 200 UG/1
800 TABLET ORAL ONCE AS NEEDED
Status: DISCONTINUED | OUTPATIENT
Start: 2023-08-29 | End: 2023-08-29

## 2023-08-29 RX ORDER — ONDANSETRON 2 MG/ML
4 INJECTION INTRAMUSCULAR; INTRAVENOUS EVERY 6 HOURS PRN
Status: DISCONTINUED | OUTPATIENT
Start: 2023-08-29 | End: 2023-08-30 | Stop reason: HOSPADM

## 2023-08-29 RX ORDER — ACETAMINOPHEN 325 MG/1
650 TABLET ORAL ONCE AS NEEDED
Status: DISCONTINUED | OUTPATIENT
Start: 2023-08-29 | End: 2023-08-29

## 2023-08-29 RX ORDER — SODIUM CHLORIDE 0.9 % (FLUSH) 0.9 %
1-10 SYRINGE (ML) INJECTION AS NEEDED
Status: DISCONTINUED | OUTPATIENT
Start: 2023-08-29 | End: 2023-08-30 | Stop reason: HOSPADM

## 2023-08-29 RX ADMIN — DIPHENHYDRAMINE HYDROCHLORIDE 25 MG: 25 CAPSULE ORAL at 13:23

## 2023-08-29 RX ADMIN — SODIUM CHLORIDE, POTASSIUM CHLORIDE, SODIUM LACTATE AND CALCIUM CHLORIDE 125 ML/HR: 600; 310; 30; 20 INJECTION, SOLUTION INTRAVENOUS at 07:25

## 2023-08-29 RX ADMIN — Medication: at 13:24

## 2023-08-29 RX ADMIN — ACETAMINOPHEN 1000 MG: 500 TABLET, FILM COATED ORAL at 13:23

## 2023-08-29 RX ADMIN — HYDROCODONE BITARTRATE AND ACETAMINOPHEN 1 TABLET: 10; 325 TABLET ORAL at 14:37

## 2023-08-29 RX ADMIN — VANCOMYCIN HYDROCHLORIDE 1500 MG: 500 INJECTION, POWDER, LYOPHILIZED, FOR SOLUTION INTRAVENOUS at 07:46

## 2023-08-29 RX ADMIN — Medication 999 MILLI-UNITS/MIN: at 11:03

## 2023-08-29 RX ADMIN — SERTRALINE 25 MG: 50 TABLET, FILM COATED ORAL at 20:58

## 2023-08-29 RX ADMIN — ACETAMINOPHEN 1000 MG: 500 TABLET, FILM COATED ORAL at 20:58

## 2023-08-29 RX ADMIN — HYDROCODONE BITARTRATE AND ACETAMINOPHEN 1 TABLET: 10; 325 TABLET ORAL at 22:22

## 2023-08-29 RX ADMIN — Medication 10 ML/HR: at 08:49

## 2023-08-29 RX ADMIN — MINERAL OIL 30 ML: 1000 SOLUTION ORAL at 11:06

## 2023-08-29 RX ADMIN — HYDROCODONE BITARTRATE AND ACETAMINOPHEN 1 TABLET: 10; 325 TABLET ORAL at 18:38

## 2023-08-29 RX ADMIN — IBUPROFEN 800 MG: 800 TABLET, FILM COATED ORAL at 18:04

## 2023-08-29 NOTE — PAYOR COMM NOTE
"TO:[facility]  FROM:ALEX WANG, RN PHONE [phone]    Ines Joshua (33 y.o. Female)       Date of Birth   1990    Social Security Number       Address   [address] [place] [id number]    Home Phone   [phone]    MRN   [id number]       Scientologist   None    Marital Status                               Admission Date   23    Admission Type   Elective    Admitting Provider   Mattie Lee CNM    Attending Provider   Mattie Lee CNM    Department, Room/Bed   [facility], [facility]       Discharge Date       Discharge Disposition       Discharge Destination                                 Attending Provider: Mattie Lee CNM    Allergies: Amoxicillin    Isolation: None   Infection: None   Code Status: CPR    Ht: 162.6 cm (64\")   Wt: 71.7 kg (158 lb)    Admission Cmt: None   Principal Problem: None                  Active Insurance as of 2023       Primary Coverage       Payor Plan Insurance Group Employer/Plan Group    [employer] [place] [employer] [place] [id number]       Payor Plan Address Payor Plan Phone Number Payor Plan Fax Number Effective Dates    [address] [address] [phone]  2020 - None Entered    [facility] [id number]         Subscriber Name Subscriber Birth Date Member ID       INES JOSHUA 1990 [id number]                     Emergency Contacts        (Rel.) Home Phone Work Phone Mobile Phone    adam diaz (Mother) [phone] -- --    Cesar Joshua (Spouse) [phone] -- --                 History & Physical        Mattie Lee CNM at 23 0840       Attestation signed by Zuleika Sinclair MD at 23 1057    I have reviewed this documentation and agree.                  [facility]  Ines Joshua  : 1990  MRN: [id number]  CSN: [id number]    History and Physical    Chief Complaint   Patient presents with    Scheduled Induction      Subjective  Ines Joshua is a 33 y.o. year old  " with an Estimated Date of Delivery: 23 currently 39w1d presenting for induction of labor for elective at term per patient request.     Risks of labor induction including prolongation of labor, increased risks for both  section and operative vaginal birth have been discussed at length.     Prenatal care has been with [facility] Morgan.  It has been complicated by anxiety and depression for which she is taking Zoloft . First PNV on 23 @ 8 weeks with 13 visits. Weight gain = 11 lbs.     OB History    Para Term  AB Living   7 4 4 0 2 4   SAB IAB Ectopic Molar Multiple Live Births   2 0 0 0 0 4      # Outcome Date GA Lbr Aime/2nd Weight Sex Delivery Anes PTL Lv   7 Current            6 Term 20 39w2d / 00:50 3118 g (6 lb 14 oz) M Vag-Spont EPI N HARSHAD      Name: FINN JOSHUA      Apgar1: 7  Apgar5: 9   5 SAB 2018           4 Term 14 40w0d  3459 g (7 lb 10 oz) F Vag-Spont EPI  HARSHAD      Complications: Kidney stones   3 Term 11 39w0d  3232 g (7 lb 2 oz) F Vag-Spont EPI  HARSHAD      Complications: Kidney stones   2 Term 08 40w0d  3884 g (8 lb 9 oz) M Vag-Spont EPI  HARSHAD      Complications: Kidney stones   1 SAB              Past Medical History:   Diagnosis Date    Kidney stones     Positive testing for group B Streptococcus 2020    Recurrent pregnancy loss, antepartum condition or complication     Rh negative status during pregnancy 2018    Wears contact lenses      Past Surgical History:   Procedure Laterality Date    COLONOSCOPY      D & C WITH SUCTION N/A 2019    Procedure: DILATATION AND CURETTAGE WITH SUCTION;  Surgeon: Aleksey Danielle MD;  Location: [facility];  Service: Obstetrics/Gynecology    DILATATION AND CURETTAGE         Current Facility-Administered Medications:     ePHEDrine Sulfate (Pressors) 5 MG/ML injection 10 mg, 10 mg, Intravenous, Q10 Min PRN, Mattie Lee CNM    fentaNYL 2mcg/mL and ropivacaine 0.2% in NS epidural  100 mL, 12 mL/hr, Epidural, Continuous, Lien Leeorah A, CNM    hydrOXYzine (ATARAX) tablet 50 mg, 50 mg, Oral, Nightly PRN, Ora Leeh A, CNM    lactated ringers bolus 1,000 mL, 1,000 mL, Intravenous, Once, Jesus, Mattie A, CNM    lactated ringers bolus 1,000 mL, 1,000 mL, Intravenous, Once, Ora Leeh A, CNM    lactated ringers infusion, 125 mL/hr, Intravenous, Continuous, Lien Leeorah A, CNM, Last Rate: 125 mL/hr at 08/29/23 0725, 125 mL/hr at 08/29/23 0725    lidocaine (XYLOCAINE) 1 % injection 0.5 mL, 0.5 mL, Intradermal, Once PRN, Ora Leeh A, CNM    mineral oil liquid 30 mL, 30 mL, Topical, Once, Ora Leeh A, CNM    Morphine sulfate (PF) injection 4 mg, 4 mg, Intravenous, Q4H PRN, Ora Leeh A, CNM    Morphine sulfate (PF) injection 6 mg, 6 mg, Intravenous, Q4H PRN, Lien Leeorah A, CNM    ondansetron (ZOFRAN) tablet 4 mg, 4 mg, Oral, Q6H PRN **OR** ondansetron (ZOFRAN) injection 4 mg, 4 mg, Intravenous, Q6H PRN, Lien Leeorah A, CNM    oxytocin (PITOCIN) 30 units in 0.9% sodium chloride 500 mL (premix), 1-20 vkiy-units/min, Intravenous, Titrated, Lien Leeorah A, CNM, Last Rate: 16 mL/hr at 08/29/23 0815, 16 viky-units/min at 08/29/23 0815    vancomycin IVPB 1500 mg in 0.9% NaCl (Premix) 500 mL, 20 mg/kg, Intravenous, Q8H, Last Rate: 333.3 mL/hr at 08/29/23 0746, 1,500 mg at 08/29/23 0746 **AND** Pharmacy to dose vancomycin, , Does not apply, Continuous PRN, Ora Leeh A, CNM    promethazine (PHENERGAN) suppository 12.5 mg, 12.5 mg, Rectal, Q6H PRN **OR** promethazine (PHENERGAN) tablet 12.5 mg, 12.5 mg, Oral, Q6H PRN, Foster, Mattie A, CNM    sodium chloride 0.9 % flush 10 mL, 10 mL, Intravenous, Q12H, Foster, Mattie A, CNM    sodium chloride 0.9 % flush 10 mL, 10 mL, Intravenous, PRN, Foster, Mattie A, CNM    sodium chloride 0.9 % infusion 40 mL, 40 mL, Intravenous, PRN, Foster, Mattie A, CNM    Allergies   Allergen Reactions    Amoxicillin Hives  "    Social History    Tobacco Use      Smoking status: Never      Smokeless tobacco: Never      Tobacco comments: Nn    Review of Systems   Constitutional: Negative.    Respiratory: Negative.     Cardiovascular: Negative.    Gastrointestinal: Negative.    Genitourinary: Negative.    Musculoskeletal: Negative.    Psychiatric/Behavioral: Negative.     All other systems reviewed and are negative.      Objective  /77 (BP Location: Left arm, Patient Position: Lying)   Pulse 65   Temp 98.3 °F (36.8 °C) (Oral)   Resp 18   Ht 162.6 cm (64\")   Wt 71.7 kg (158 lb)   LMP 12/01/2022 (Approximate)   SpO2 98%   Breastfeeding Yes   BMI 27.12 kg/m²                                           General:  well developed; well nourished  no acute distress   Neck:    Heart:   supple and no masses  normal apical impulse  regular rate and rhythm   Lungs: breathing is unlabored  clear to auscultation bilaterally   Abdomen: Gravid and nontender  EFW: 8#, Repeat growth scan @ 36 weeks=97%, anterior placenta   FHT's: reassuring, reactive, and category 1   Cervix: was checked (by me): 3-4 cm / 80 % / 0, leaking clear fluid   Presentation: cephalic   Contractions: regular every 2-4 minutes - external monitors used Pitocin @ 16 mu   Extremities:   normal appearance with no cyanosis or edema and no calf tenderness   Skin:  Skin color, texture, turgor normal. No rashes or lesions or Skin warm and dry   Psych:  Normal. and Alert and oriented, appropriate affect.       Prenatal Labs  Lab Results   Component Value Date    HGB 11.1 (L) 08/28/2023    HEPBSAG Negative 01/27/2023    ABSCRN Positive 08/28/2023    [id number] Non Reactive 01/27/2023    HEPCVIRUSABY <0.1 01/27/2023    STREPGPB Positive (A) 08/07/2023       Current Labs Reviewed   Lab Results (last 24 hours)       Procedure Component Value Units Date/Time    CBC & Differential [[id number]]  (Abnormal) Collected: 08/28/23 2245    Specimen: Blood Updated: 08/28/23 2254    Narrative:   "    The following orders were created for panel order CBC & Differential.  Procedure                               Abnormality         Status                     ---------                               -----------         ------                     CBC Auto Differential[[id number]]        Abnormal            Final result                 Please view results for these tests on the individual orders.    CBC Auto Differential [[id number]]  (Abnormal) Collected: 23    Specimen: Blood Updated: 23     WBC 9.98 10*3/mm3      RBC 3.61 10*6/mm3      Hemoglobin 11.1 g/dL      Hematocrit 31.6 %      MCV 87.5 fL      MCH 30.7 pg      MCHC 35.1 g/dL      RDW 14.0 %      RDW-SD 44.0 fl      MPV 10.9 fL      Platelets 169 10*3/mm3      Neutrophil % 73.1 %      Lymphocyte % 18.0 %      Monocyte % 7.7 %      Eosinophil % 0.3 %      Basophil % 0.4 %      Immature Grans % 0.5 %      Neutrophils, Absolute 7.29 10*3/mm3      Lymphocytes, Absolute 1.80 10*3/mm3      Monocytes, Absolute 0.77 10*3/mm3      Eosinophils, Absolute 0.03 10*3/mm3      Basophils, Absolute 0.04 10*3/mm3      Immature Grans, Absolute 0.05 10*3/mm3      nRBC 0.0 /100 WBC                ASSESSMENT  IUP at 39w1d  Induction of labor because of elective at term per patient request  Group B strep status: positive  Reactive NST    PLAN  Admit to   Low dose Pitocin induction and increase per protocol @ 0500  GBS protocol  All procedures explained to patient and spouse. Questions answered and verbalized understanding.  Epidural   Anticipate     FIDELINA Nieves  2023  08:40 EDT         Electronically signed by Zuleika Sinclair MD at 23 1057          Operative/Procedure Notes (last 24 hours)        Mattie Lee CNM at 23 1303           [facility]   Vaginal Delivery Note    Patient Name: Ines Huntley  : 1990  MRN: [id number]    Date of Delivery: 2023     Diagnosis     Pre & Post-Delivery:  Intrauterine  pregnancy at 39w1d  Labor status:      Pregnancy     (spontaneous vaginal delivery)             Problem List    Transfer to Postpartum     Review the Delivery Report for details.     Delivery     Delivery: Vaginal, Spontaneous     YOB: 2025    Time of Birth:  Gestational Age 10:55 AM   39w1d     Anesthesia: Epidural     Delivering clinician: Mattie Lee    Forceps?   No   Vacuum? No    Shoulder dystocia present: No        Delivery narrative:  Ines progressed to C+P and pushed effectively.  Mouth and nose bulb suctioned on perineum. Right anterior shoulder was delivered easily with gentle traction and maternal effort followed by posterior shoulder.  viable male. Infant stimulated, dried, and placed on mom's abdomen. Infant with lusty cry and spontaneous respirations. Delayed cord double clamped; cut by dad. Cord blood obtained. Placenta delivered Schultze intact with 3V cord. Pitocin 20 units IV given.  1st degree perineal laceration repaired. FF @ U, light rubra lochia.       Infant     Findings: male  infant     Infant observations: Weight: 4232 g (9 lb 5.3 oz)   Length: 21  in  Observations/Comments:        Apgars: 8  @ 1 minute /    10  @ 5 minutes   Infant Name: Treyton     Placenta & Cord         Placenta delivered  Spontaneous  at   2023 10:59 AM     Cord: 3 vessels  present.   Nuchal Cord?  no   Cord blood obtained: Yes    Cord gases obtained:  No    Cord gas results: Venous:  No results found for: PHCVEN    Arterial:  No results found for: PHCART     Repair     Episiotomy: None     No    Lacerations: Yes  Laceration Information  Laceration Repaired?   Perineal: 1st  Yes    Periurethral:       Labial:       Sulcus:       Vaginal:       Cervical:         Suture used for repair: 3-0 chromic gut   Estimated Blood Loss: 300 ml     Quantitative Blood Loss:    QBL from VAG DEL: 350 (23 1132)     Complications     none    Disposition     Mother to Mother Baby/Postpartum  in  stable condition currently.  Baby to remains with mom  in stable condition currently.    Mattie Lee CNM  08/29/23  13:03 EDT          Electronically signed by Mattie Lee CNM at 08/29/23 1307       Physician Progress Notes (last 24 hours)  Notes from 08/28/23 1349 through 08/29/23 1349   No notes of this type exist for this encounter.

## 2023-08-29 NOTE — LACTATION NOTE
Pt plans on breastfeeding.  4 other children. Patient educated on importance of skin to skin and frequent nursing. Educated on feeding infant 8-12 times in a 24 hour period. Verbalized understanding.

## 2023-08-29 NOTE — L&D DELIVERY NOTE
[facility]   Vaginal Delivery Note    Patient Name: Ines Huntley  : 1990  MRN: [id number]    Date of Delivery: 2023     Diagnosis     Pre & Post-Delivery:  Intrauterine pregnancy at 39w1d  Labor status:      Pregnancy     (spontaneous vaginal delivery)             Problem List    Transfer to Postpartum     Review the Delivery Report for details.     Delivery     Delivery: Vaginal, Spontaneous     YOB: 2025    Time of Birth:  Gestational Age 10:55 AM   39w1d     Anesthesia: Epidural     Delivering clinician: Mattie Lee    Forceps?   No   Vacuum? No    Shoulder dystocia present: No        Delivery narrative:  Ines progressed to C+P and pushed effectively.  Mouth and nose bulb suctioned on perineum. Right anterior shoulder was delivered easily with gentle traction and maternal effort followed by posterior shoulder.  viable male. Infant stimulated, dried, and placed on mom's abdomen. Infant with lusty cry and spontaneous respirations. Delayed cord double clamped; cut by dad. Cord blood obtained. Placenta delivered Schultze intact with 3V cord. Pitocin 20 units IV given.  1st degree perineal laceration repaired. FF @ U, light rubra lochia.       Infant     Findings: male  infant     Infant observations: Weight: 4232 g (9 lb 5.3 oz)   Length: 21  in  Observations/Comments:        Apgars: 8  @ 1 minute /    10  @ 5 minutes   Infant Name: Treyton     Placenta & Cord         Placenta delivered  Spontaneous  at   2023 10:59 AM     Cord: 3 vessels  present.   Nuchal Cord?  no   Cord blood obtained: Yes    Cord gases obtained:  No    Cord gas results: Venous:  No results found for: PHCVEN    Arterial:  No results found for: PHCART     Repair     Episiotomy: None     No    Lacerations: Yes  Laceration Information  Laceration Repaired?   Perineal: 1st  Yes    Periurethral:       Labial:       Sulcus:       Vaginal:       Cervical:         Suture used for repair: 3-0 chromic  gut   Estimated Blood Loss: 300 ml     Quantitative Blood Loss:    QBL from VAG DEL: 350 (08/29/23 1132)     Complications     none    Disposition     Mother to Mother Baby/Postpartum  in stable condition currently.  Baby to remains with mom  in stable condition currently.    Mattie Lee CNM  08/29/23  13:03 EDT

## 2023-08-29 NOTE — ANESTHESIA PROCEDURE NOTES
Labor Epidural      Patient reassessed immediately prior to procedure    Patient location during procedure: OB  Start Time: 8/29/2023 8:40 AM  Stop Time: 8/29/2023 9:06 AM  Indication:at surgeon's request  Performed By  CRNA/CAA: Alvin Hankins CRNA  Preanesthetic Checklist  Completed: patient identified, IV checked, site marked, risks and benefits discussed, surgical consent, monitors and equipment checked, pre-op evaluation and timeout performed  Additional Notes  Risks discussed , including but not limited to:  Headache, itching, n&v, infection, failure, decreased bp, decreased fetal hr, possible c/s. Permanent chronic back pain, nerve damage, paralysis, etc. All questions answered and informed consent obtained.    Skin localized with lidocaine skin wheal.  Test dose 3 ml of 1.5% lidocaine with 1:200,000 Epinephrine    Bolus dose of 10ml Fentanyl/Ropivicaine pumpmix solution given and infusion started.  Prep:  Pt Position:sitting  Sterile Tech:cap, gloves, mask and sterile barrier  Prep:chlorhexidine gluconate and isopropyl alcohol  Monitoring:blood pressure monitoring and continuous pulse oximetry  Epidural Block Procedure:  Approach:midline  Guidance:landmark technique and palpation technique  Location:L4-L5  Needle Type:Tuohy  Needle Gauge:18 G  Loss of Resistance Medium: saline  Loss of Resistance: 7cm  Cath Depth at skin:12 cm  Paresthesia: none  Aspiration:negative  Test Dose:negative  Number of Attempts: 1  Post Assessment:  Dressing:occlusive dressing applied and secured with tape  Pt Tolerance:patient tolerated the procedure well with no apparent complications  Complications:no

## 2023-08-29 NOTE — PHARMACY RECOMMENDATION
"Pharmacy Consult-Vancomycin Dosing    Ines Huntley is a  33 y.o. female receiving vancomycin therapy.     Indication: Group B Streptococcus positive mother in pregnancy   Consulting Provider: MARIBELL Lee CNM    Goal AUC: 400 to 600 (mg/L)×hr    Current Antimicrobial Therapy  Anti-Infectives (From admission, onward)      Ordered     Dose/Rate Route Frequency Start Stop    08/29/23 0925  vancomycin 1000 mg/250 mL 0.9% NS (vial-mate)        Ordering Provider: Mattie Lee CNM   See Hyperspace for full Linked Orders Report.    1,000 mg  250 mL/hr over 60 Minutes Intravenous Every 12 Hours 08/29/23 2100 08/31/23 0859    08/29/23 0925  Pharmacy to dose vancomycin        Ordering Provider: Mattie Lee CNM   See Hyperspace for full Linked Orders Report.     Does not apply Continuous PRN 08/29/23 0923 08/30/23 2233            Labs  Results from last 7 days   Lab Units 08/29/23  0721 08/28/23  2245   WBC 10*3/mm3  --  9.98   CREATININE mg/dL 0.56*  --       Estimated Creatinine Clearance: 136.1 mL/min (A) (by C-G formula based on SCr of 0.56 mg/dL (L)).  Temp Readings from Last 1 Encounters:   08/29/23 98.3 °F (36.8 °C) (Oral)       Microbiology Culture results  Microbiology Results (last 10 days)       ** No results found for the last 240 hours. **            Evaluation of Dosing     Last Dose Received in the ED/Outside Facility: n/a  Is Patient on Dialysis or Renal Replacement: no    Ht - 162.6 cm (64\")  Wt - 71.7 kg (158 lb)    Evaluation of Level                      Insight[employer] AUC Calculation     Current dose/frequency: 1500 mg (20.9 mg/kg) q 8 hrs     Current AUC:   1035 (mg/L)×hr     Current C(trough): 34.7 mcg/mL  _________________________________     New dose/frequency: 1000 mg (13.9 mg/kg) q 12 hrs     New AUC:   467 (mg/L)×hr     New C(trough): 13.9 mcg/mL      Assessment/Plan    Pharmacy to dose vancomycin for Group B Streptococcus positive mother in pregnancy. Goal  to 600 (mg/L)×hr.  Adjust " vancomycin dose to 1000 mg IV q 12 hrs.  Continue while patient is in labor.  Pharmacy will continue to monitor renal function, cultures and sensitivities, and clinical status to adjust regimen as necessary.      Thank you for the opportunity to consult on this patient.    Richardson White RPH, Pharm.D.  08/29/23  09:31 EDT

## 2023-08-29 NOTE — NON STRESS TEST
Ines Huntley, a  at 39w1d with an MATTHEW of 2023, by Ultrasound, was seen at [facility] for a nonstress test.    Chief Complaint   Patient presents with    Scheduled Induction       Patient Active Problem List   Diagnosis    History of kidney stones    Rh negative, antepartum    Antepartum multigravida of advanced maternal age    Pregnancy       Start Time:   Stop Time:     Interpretation A  Nonstress Test Interpretation A: Reactive

## 2023-08-29 NOTE — PLAN OF CARE
Goal Outcome Evaluation:  Plan of Care Reviewed With: patient, spouse        Progress: improving  Outcome Evaluation: VSS. I & O adequate. No complaints of pain, desires epidural. Anticipates .

## 2023-08-29 NOTE — ANESTHESIA PREPROCEDURE EVALUATION
Anesthesia Evaluation     Patient summary reviewed and Nursing notes reviewed   NPO Solid Status: > 8 hours  NPO Liquid Status: > 8 hours           Airway   Mallampati: II  TM distance: >3 FB  Neck ROM: full  No difficulty expected  Dental      Pulmonary    Cardiovascular   Exercise tolerance: good (4-7 METS)    Rhythm: regular  Rate: normal        Neuro/Psych  GI/Hepatic/Renal/Endo    (+) renal disease stones    Musculoskeletal     Abdominal    Substance History      OB/GYN    (-)  Pregnant        Other        ROS/Med Hx Other: plt 159                  Anesthesia Plan    ASA 2 - emergent     epidural     (Risks discussed , including but not limited to:  Headache, itching, n&v, infection, failure, decreased blood pressure, permanent chronic/back pain, nerve damage, paralysis, etc. All questions answered and informed consent obtained. )  intravenous induction     Anesthetic plan, risks, benefits, and alternatives have been provided, discussed and informed consent has been obtained with: patient.    Plan discussed with CRNA.

## 2023-08-29 NOTE — PLAN OF CARE
Goal Outcome Evaluation:                       of viable male infant. Fundal and lochia wnl, patient ambulated to bathroom and was about to urinate. Stable to move to postpartum.

## 2023-08-29 NOTE — H&P
EDWIN Morgan  Ines Joshua  : 1990  MRN: [id number]  CSN: [id number]    History and Physical    Chief Complaint   Patient presents with    Scheduled Induction      Subjective   Ines Joshua is a 33 y.o. year old  with an Estimated Date of Delivery: 23 currently 39w1d presenting for induction of labor for elective at term per patient request.     Risks of labor induction including prolongation of labor, increased risks for both  section and operative vaginal birth have been discussed at length.     Prenatal care has been with GUICHO Morgan.  It has been complicated by anxiety and depression for which she is taking Zoloft . First PNV on 23 @ 8 weeks with 13 visits. Weight gain = 11 lbs.     OB History    Para Term  AB Living   7 4 4 0 2 4   SAB IAB Ectopic Molar Multiple Live Births   2 0 0 0 0 4      # Outcome Date GA Lbr Aime/2nd Weight Sex Delivery Anes PTL Lv   7 Current            6 Term 20 39w2d / 00:50 3118 g (6 lb 14 oz) M Vag-Spont EPI N HARSHAD      Name: FINN JOSHUA      Apgar1: 7  Apgar5: 9   5 SAB 2018           4 Term 14 40w0d  3459 g (7 lb 10 oz) F Vag-Spont EPI  HARSHAD      Complications: Kidney stones   3 Term 11 39w0d  3232 g (7 lb 2 oz) F Vag-Spont EPI  HARSHAD      Complications: Kidney stones   2 Term 08 40w0d  3884 g (8 lb 9 oz) M Vag-Spont EPI  HARSHAD      Complications: Kidney stones   1 SAB              Past Medical History:   Diagnosis Date    Kidney stones     Positive testing for group B Streptococcus 2020    Recurrent pregnancy loss, antepartum condition or complication     Rh negative status during pregnancy 2018    Wears contact lenses      Past Surgical History:   Procedure Laterality Date    COLONOSCOPY      D & C WITH SUCTION N/A 2019    Procedure: DILATATION AND CURETTAGE WITH SUCTION;  Surgeon: Aleksey Danielle MD;  Location: [facility] OR;  Service: Obstetrics/Gynecology    DILATATION AND  CURETTAGE         Current Facility-Administered Medications:     ePHEDrine Sulfate (Pressors) 5 MG/ML injection 10 mg, 10 mg, Intravenous, Q10 Min PRN, Mattie Lee, REMYM    fentaNYL 2mcg/mL and ropivacaine 0.2% in NS epidural 100 mL, 12 mL/hr, Epidural, Continuous, Ora Leeh A, CNM    hydrOXYzine (ATARAX) tablet 50 mg, 50 mg, Oral, Nightly PRN, Ora Leeh A, CNM    lactated ringers bolus 1,000 mL, 1,000 mL, Intravenous, Once, Jesus, Mattie A, CNM    lactated ringers bolus 1,000 mL, 1,000 mL, Intravenous, Once, Ora Leeh A, CNM    lactated ringers infusion, 125 mL/hr, Intravenous, Continuous, Ora Leeh A, CNM, Last Rate: 125 mL/hr at 08/29/23 0725, 125 mL/hr at 08/29/23 0725    lidocaine (XYLOCAINE) 1 % injection 0.5 mL, 0.5 mL, Intradermal, Once PRN, Mattie Lee A, CNM    mineral oil liquid 30 mL, 30 mL, Topical, Once, Ora Leeh A, CNM    Morphine sulfate (PF) injection 4 mg, 4 mg, Intravenous, Q4H PRN, Mattie Lee A, CNM    Morphine sulfate (PF) injection 6 mg, 6 mg, Intravenous, Q4H PRN, Lien Leeorah A, CNM    ondansetron (ZOFRAN) tablet 4 mg, 4 mg, Oral, Q6H PRN **OR** ondansetron (ZOFRAN) injection 4 mg, 4 mg, Intravenous, Q6H PRN, Mattie Lee A, CNM    oxytocin (PITOCIN) 30 units in 0.9% sodium chloride 500 mL (premix), 1-20 viky-units/min, Intravenous, Titrated, Mattie Lee A, CNM, Last Rate: 16 mL/hr at 08/29/23 0815, 16 viky-units/min at 08/29/23 0815    vancomycin IVPB 1500 mg in 0.9% NaCl (Premix) 500 mL, 20 mg/kg, Intravenous, Q8H, Last Rate: 333.3 mL/hr at 08/29/23 0746, 1,500 mg at 08/29/23 0746 **AND** Pharmacy to dose vancomycin, , Does not apply, Continuous PRN, Mattie Lee CNM    promethazine (PHENERGAN) suppository 12.5 mg, 12.5 mg, Rectal, Q6H PRN **OR** promethazine (PHENERGAN) tablet 12.5 mg, 12.5 mg, Oral, Q6H PRN, Mattie Lee CNM    sodium chloride 0.9 % flush 10 mL, 10 mL, Intravenous, Q12H, Mattie Lee CNM     "sodium chloride 0.9 % flush 10 mL, 10 mL, Intravenous, PRN, Foster, Mattie A, CNM    sodium chloride 0.9 % infusion 40 mL, 40 mL, Intravenous, PRN, Foster, Mattie A, CNM    Allergies   Allergen Reactions    Amoxicillin Hives     Social History    Tobacco Use      Smoking status: Never      Smokeless tobacco: Never      Tobacco comments: Nn    Review of Systems   Constitutional: Negative.    Respiratory: Negative.     Cardiovascular: Negative.    Gastrointestinal: Negative.    Genitourinary: Negative.    Musculoskeletal: Negative.    Psychiatric/Behavioral: Negative.     All other systems reviewed and are negative.      Objective   /77 (BP Location: Left arm, Patient Position: Lying)   Pulse 65   Temp 98.3 °F (36.8 °C) (Oral)   Resp 18   Ht 162.6 cm (64\")   Wt 71.7 kg (158 lb)   LMP 12/01/2022 (Approximate)   SpO2 98%   Breastfeeding Yes   BMI 27.12 kg/m²                                           General:  well developed; well nourished  no acute distress   Neck:    Heart:   supple and no masses  normal apical impulse  regular rate and rhythm   Lungs: breathing is unlabored  clear to auscultation bilaterally   Abdomen: Gravid and nontender  EFW: 8#, Repeat growth scan @ 36 weeks=97%, anterior placenta   FHT's: reassuring, reactive, and category 1   Cervix: was checked (by me): 3-4 cm / 80 % / 0, leaking clear fluid   Presentation: cephalic   Contractions: regular every 2-4 minutes - external monitors used Pitocin @ 16 mu   Extremities:   normal appearance with no cyanosis or edema and no calf tenderness   Skin:  Skin color, texture, turgor normal. No rashes or lesions or Skin warm and dry   Psych:  Normal. and Alert and oriented, appropriate affect.       Prenatal Labs  Lab Results   Component Value Date    HGB 11.1 (L) 08/28/2023    HEPBSAG Negative 01/27/2023    ABSCRN Positive 08/28/2023    BHW2EVG6 Non Reactive 01/27/2023    HEPCVIRUSABY <0.1 01/27/2023    STREPGPB Positive (A) 08/07/2023 "       Current Labs Reviewed   Lab Results (last 24 hours)       Procedure Component Value Units Date/Time    CBC & Differential [[id number]]  (Abnormal) Collected: 23    Specimen: Blood Updated: 23    Narrative:      The following orders were created for panel order CBC & Differential.  Procedure                               Abnormality         Status                     ---------                               -----------         ------                     CBC Auto Differential[[id number]]        Abnormal            Final result                 Please view results for these tests on the individual orders.    CBC Auto Differential [[id number]]  (Abnormal) Collected: 23    Specimen: Blood Updated: 23     WBC 9.98 10*3/mm3      RBC 3.61 10*6/mm3      Hemoglobin 11.1 g/dL      Hematocrit 31.6 %      MCV 87.5 fL      MCH 30.7 pg      MCHC 35.1 g/dL      RDW 14.0 %      RDW-SD 44.0 fl      MPV 10.9 fL      Platelets 169 10*3/mm3      Neutrophil % 73.1 %      Lymphocyte % 18.0 %      Monocyte % 7.7 %      Eosinophil % 0.3 %      Basophil % 0.4 %      Immature Grans % 0.5 %      Neutrophils, Absolute 7.29 10*3/mm3      Lymphocytes, Absolute 1.80 10*3/mm3      Monocytes, Absolute 0.77 10*3/mm3      Eosinophils, Absolute 0.03 10*3/mm3      Basophils, Absolute 0.04 10*3/mm3      Immature Grans, Absolute 0.05 10*3/mm3      nRBC 0.0 /100 WBC                ASSESSMENT  IUP at 39w1d  Induction of labor because of elective at term per patient request  Group B strep status: positive  Reactive NST    PLAN  Admit to   Low dose Pitocin induction and increase per protocol @ 0500  GBS protocol  All procedures explained to patient and spouse. Questions answered and verbalized understanding.  Epidural   Anticipate     Mattie Lee, APRN  2023  08:40 EDT

## 2023-08-30 VITALS
BODY MASS INDEX: 26.98 KG/M2 | TEMPERATURE: 98.6 F | WEIGHT: 158 LBS | HEART RATE: 71 BPM | DIASTOLIC BLOOD PRESSURE: 72 MMHG | HEIGHT: 64 IN | RESPIRATION RATE: 16 BRPM | SYSTOLIC BLOOD PRESSURE: 107 MMHG | OXYGEN SATURATION: 96 %

## 2023-08-30 LAB
BASOPHILS # BLD AUTO: 0.04 10*3/MM3 (ref 0–0.2)
BASOPHILS NFR BLD AUTO: 0.4 % (ref 0–1.5)
DEPRECATED RDW RBC AUTO: 46.7 FL (ref 37–54)
EOSINOPHIL # BLD AUTO: 0.07 10*3/MM3 (ref 0–0.4)
EOSINOPHIL NFR BLD AUTO: 0.7 % (ref 0.3–6.2)
ERYTHROCYTE [DISTWIDTH] IN BLOOD BY AUTOMATED COUNT: 14.3 % (ref 12.3–15.4)
HCT VFR BLD AUTO: 33.3 % (ref 34–46.6)
HGB BLD-MCNC: 11.3 G/DL (ref 12–15.9)
IMM GRANULOCYTES # BLD AUTO: 0.06 10*3/MM3 (ref 0–0.05)
IMM GRANULOCYTES NFR BLD AUTO: 0.6 % (ref 0–0.5)
LYMPHOCYTES # BLD AUTO: 2.7 10*3/MM3 (ref 0.7–3.1)
LYMPHOCYTES NFR BLD AUTO: 27.2 % (ref 19.6–45.3)
MCH RBC QN AUTO: 30.5 PG (ref 26.6–33)
MCHC RBC AUTO-ENTMCNC: 33.9 G/DL (ref 31.5–35.7)
MCV RBC AUTO: 89.8 FL (ref 79–97)
MONOCYTES # BLD AUTO: 0.76 10*3/MM3 (ref 0.1–0.9)
MONOCYTES NFR BLD AUTO: 7.6 % (ref 5–12)
NEUTROPHILS NFR BLD AUTO: 6.31 10*3/MM3 (ref 1.7–7)
NEUTROPHILS NFR BLD AUTO: 63.5 % (ref 42.7–76)
NRBC BLD AUTO-RTO: 0 /100 WBC (ref 0–0.2)
PLATELET # BLD AUTO: 159 10*3/MM3 (ref 140–450)
PMV BLD AUTO: 11 FL (ref 6–12)
RBC # BLD AUTO: 3.71 10*6/MM3 (ref 3.77–5.28)
WBC NRBC COR # BLD: 9.94 10*3/MM3 (ref 3.4–10.8)

## 2023-08-30 PROCEDURE — 85025 COMPLETE CBC W/AUTO DIFF WBC: CPT | Performed by: MIDWIFE

## 2023-08-30 RX ORDER — FERROUS SULFATE 325(65) MG
325 TABLET ORAL 2 TIMES DAILY WITH MEALS
Qty: 60 TABLET | Refills: 1 | Status: SHIPPED | OUTPATIENT
Start: 2023-08-30

## 2023-08-30 RX ORDER — IBUPROFEN 800 MG/1
800 TABLET ORAL EVERY 8 HOURS
Qty: 30 TABLET | Refills: 0 | Status: SHIPPED | OUTPATIENT
Start: 2023-08-30

## 2023-08-30 RX ORDER — ACETAMINOPHEN 500 MG
1000 TABLET ORAL EVERY 8 HOURS PRN
Qty: 60 TABLET | Refills: 0 | Status: SHIPPED | OUTPATIENT
Start: 2023-08-30

## 2023-08-30 RX ORDER — PSEUDOEPHEDRINE HCL 30 MG
100 TABLET ORAL 2 TIMES DAILY PRN
Qty: 60 CAPSULE | Refills: 1 | Status: SHIPPED | OUTPATIENT
Start: 2023-08-30

## 2023-08-30 RX ADMIN — DOCUSATE SODIUM 100 MG: 100 CAPSULE, LIQUID FILLED ORAL at 08:28

## 2023-08-30 RX ADMIN — IBUPROFEN 800 MG: 800 TABLET, FILM COATED ORAL at 11:30

## 2023-08-30 RX ADMIN — IBUPROFEN 800 MG: 800 TABLET, FILM COATED ORAL at 03:00

## 2023-08-30 RX ADMIN — HYDROCODONE BITARTRATE AND ACETAMINOPHEN 1 TABLET: 10; 325 TABLET ORAL at 07:29

## 2023-08-30 RX ADMIN — PRENATAL VITAMINS-IRON FUMARATE 27 MG IRON-FOLIC ACID 0.8 MG TABLET 1 TABLET: at 08:28

## 2023-08-30 RX ADMIN — HYDROCODONE BITARTRATE AND ACETAMINOPHEN 1 TABLET: 10; 325 TABLET ORAL at 03:00

## 2023-08-30 NOTE — PLAN OF CARE
Goal Outcome Evaluation:  Plan of Care Reviewed With: patient           Outcome Evaluation: VSS, fundal and lochia WNL, pain well controlled with PO meds, breastfeeding and bonding well. Discharge teaching given and was verbally understood. Patient to call OB office tomorrow to schedule OB follow up.

## 2023-08-30 NOTE — DISCHARGE SUMMARY
Discharge Summary     Eddie  Ines Huntley  : 1990  MRN: [id number]  CSN: [id number]    Date of Admission: 2023   Date of Discharge:  2023   Delivering Physician: Mattie LAURA Foster        Admission Diagnosis: Pregnancy [Z34.90]   Discharge Diagnosis: Same as above plus  Pregnancy at 39w1d - delivered    3. Mild anemia   Procedures: 2023  - Vaginal, Spontaneous       Hospital Course  Patient is a 33 y.o.  who at 39w1d had a vaginal birth.  Her postpartum course was without complications.  On PPD #1 she requested discharge home.  She had normal lochia and pain well controlled with oral medications.    Infant  male  fetus weighing 4232 g (9 lb 5.3 oz)   Apgars -  8 @ 1 minute /  10 @ 5 minutes.    Discharge labs  Lab Results   Component Value Date    WBC 9.94 2023    HGB 11.3 (L) 2023    HCT 33.3 (L) 2023     2023       Discharge Medications     Discharge Medications        New Medications        Instructions Start Date   Acetaminophen Extra Strength 500 MG tablet   1,000 mg, Oral, Every 8 Hours PRN      docusate sodium 100 MG capsule  Commonly known as: COLACE   100 mg, Oral, 2 Times Daily PRN      ferrous sulfate 325 (65 FE) MG tablet   325 mg, Oral, 2 Times Daily With Meals      ibuprofen 800 MG tablet  Commonly known as: ADVIL,MOTRIN   800 mg, Oral, Every 8 Hours             Continue These Medications        Instructions Start Date   PRENATAL GUMMY VITAMIN   1 each, Oral, Daily      sertraline 25 MG tablet  Commonly known as: ZOLOFT   1 tablet, Oral, Daily               Discharge Disposition Home or Self Care   Condition on Discharge: good   Follow-up: 6 weeks with [facility] KD Middleton PA-C  2023

## 2023-08-30 NOTE — PLAN OF CARE
Problem: Adult Inpatient Plan of Care  Goal: Plan of Care Review  Outcome: Ongoing, Progressing  Flowsheets (Taken 8/30/2023 0548)  Progress: improving  Plan of Care Reviewed With: patient  Outcome Evaluation: Vital signs stable. FFu-2. Small amt lochia. Tolerating po pain meds. Breastfeeding well.  Goal: Patient-Specific Goal (Individualized)  Outcome: Ongoing, Progressing  Goal: Absence of Hospital-Acquired Illness or Injury  Outcome: Ongoing, Progressing  Intervention: Identify and Manage Fall Risk  Recent Flowsheet Documentation  Taken 8/30/2023 0500 by Mariano Gusman RN  Safety Promotion/Fall Prevention: safety round/check completed  Taken 8/30/2023 0400 by Mariano Gusman RN  Safety Promotion/Fall Prevention: safety round/check completed  Taken 8/30/2023 0300 by Mariano Gusman RN  Safety Promotion/Fall Prevention: safety round/check completed  Taken 8/30/2023 0200 by Mariano Gusman RN  Safety Promotion/Fall Prevention: safety round/check completed  Taken 8/30/2023 0100 by Mariano Gusman RN  Safety Promotion/Fall Prevention: safety round/check completed  Taken 8/30/2023 0000 by Mariano Gusman RN  Safety Promotion/Fall Prevention: safety round/check completed  Taken 8/29/2023 2300 by Mariano Gusman RN  Safety Promotion/Fall Prevention: safety round/check completed  Taken 8/29/2023 2200 by Mariano Gusman RN  Safety Promotion/Fall Prevention: safety round/check completed  Taken 8/29/2023 2058 by Mariano Gusman RN  Safety Promotion/Fall Prevention: safety round/check completed  Goal: Optimal Comfort and Wellbeing  Outcome: Ongoing, Progressing  Intervention: Provide Person-Centered Care  Recent Flowsheet Documentation  Taken 8/29/2023 2058 by Mariano Gusman RN  Trust Relationship/Rapport:   care explained   choices provided   emotional support provided   empathic listening provided   questions answered   questions encouraged   reassurance provided   thoughts/feelings acknowledged  Goal: Readiness for  Transition of Care  Outcome: Ongoing, Progressing  Intervention: Mutually Develop Transition Plan  Recent Flowsheet Documentation  Taken 8/29/2023 2150 by Mariano Gusman RN  Transportation Anticipated: family or friend will provide  Patient/Family Anticipated Services at Transition: none  Patient/Family Anticipates Transition to: home with family   Goal Outcome Evaluation:  Plan of Care Reviewed With: patient        Progress: improving  Outcome Evaluation: Vital signs stable. FFu-2. Small amt lochia. Tolerating po pain meds. Breastfeeding well.

## 2023-08-31 NOTE — PAYOR COMM NOTE
"To:  [employer]  From: Divine Koenig RN  Phone: [phone]  Fax: [phone]  NPI: [id number]  TIN: [id number]  Member ID: [id number]   MRN: [id number]    Ines Joshua (33 y.o. Female)       Date of Birth   1990    Social Security Number       Address   [address] [facility] [place] [address]    Home Phone   [phone]    MRN   [id number]       [employer]   None    Marital Status                               Admission Date   8/28/23    Admission Type   Elective    Admitting Provider   Mattie Lee CNM    Attending Provider       Department, Room/Bed   [facility] OB GYN, W205/1       Discharge Date   8/30/2023    Discharge Disposition   Home or Self Care    Discharge Destination                                 Attending Provider: (none)   Allergies: Amoxicillin    Isolation: None   Infection: None   Code Status: Prior    Ht: 162.6 cm (64\")   Wt: 71.7 kg (158 lb)    Admission Cmt: None   Principal Problem: None                  Active Insurance as of 8/28/2023       Primary Coverage       Payor Plan Insurance Group Employer/Plan Group    [employer] MEDICA[employer] M#[id number]       Payor Plan Address Payor Plan Phone Number Payor Plan Fax Number Effective Dates    [address] [address] [phone]  2/6/2020 - None Entered    [facility] [id number]         Subscriber Name Subscriber Birth Date Member ID       INES JOSHUA 1990 [id number]                     Emergency Contacts        (Rel.) Home Phone Work Phone Mobile Phone    adam diaz (Mother) [phone] -- --    Cesar Joshua (Spouse) [phone] -- --                 Discharge Summary        Taylor Middleton PA-C at 08/30/23 1230       Attestation signed by Rishi Torres MD at 08/30/23 1526    I agree with the assessment and plan.    Rishi Torres MD  Obstetrics and Gynecology  [facility]                  Discharge Summary    [facility] " Audi  : 1990  MRN: [id number]  [facility]: [id number]    Date of Admission: 2023   Date of Discharge:  2023   Delivering Physician: Mattie LAURA Foster        Admission Diagnosis: Pregnancy [Z34.90]   Discharge Diagnosis: Same as above plus  Pregnancy at 39w1d - delivered    3. Mild anemia   Procedures: 2023  - Vaginal, Spontaneous       Hospital Course  Patient is a 33 y.o.  who at 39w1d had a vaginal birth.  Her postpartum course was without complications.  On PPD #1 she requested discharge home.  She had normal lochia and pain well controlled with oral medications.    Infant  male  fetus weighing 4232 g (9 lb 5.3 oz)   Apgars -  8 @ 1 minute /  10 @ 5 minutes.    Discharge labs  Lab Results   Component Value Date    WBC 9.94 2023    HGB 11.3 (L) 2023    HCT 33.3 (L) 2023     2023       Discharge Medications     Discharge Medications        New Medications        Instructions Start Date   Acetaminophen Extra Strength 500 MG tablet   1,000 mg, Oral, Every 8 Hours PRN      docusate sodium 100 MG capsule  Commonly known as: COLACE   100 mg, Oral, 2 Times Daily PRN      ferrous sulfate 325 (65 FE) MG tablet   325 mg, Oral, 2 Times Daily With Meals      ibuprofen 800 MG tablet  Commonly known as: ADVIL,MOTRIN   800 mg, Oral, Every 8 Hours             Continue These Medications        Instructions Start Date   PRENATAL GUMMY VITAMIN   1 each, Oral, Daily      sertraline 25 MG tablet  Commonly known as: ZOLOFT   1 tablet, Oral, Daily               Discharge Disposition Home or Self Care   Condition on Discharge: good   Follow-up: 6 weeks with [facility] [place] KD Middleton PA-C  2023    Electronically signed by Rishi Torres MD at 23 1968

## 2023-09-01 LAB
BH BB BLOOD EXPIRATION DATE: NORMAL
BH BB BLOOD EXPIRATION DATE: NORMAL
BH BB BLOOD TYPE BARCODE: 9500
BH BB BLOOD TYPE BARCODE: NORMAL
BH BB DISPENSE STATUS: NORMAL
BH BB DISPENSE STATUS: NORMAL
BH BB PRODUCT CODE: NORMAL
BH BB PRODUCT CODE: NORMAL
BH BB UNIT NUMBER: NORMAL
BH BB UNIT NUMBER: NORMAL
CROSSMATCH INTERPRETATION: NORMAL
CROSSMATCH INTERPRETATION: NORMAL
UNIT  ABO: NORMAL
UNIT  ABO: NORMAL
UNIT  RH: NORMAL
UNIT  RH: NORMAL

## 2023-09-29 ENCOUNTER — HOSPITAL ENCOUNTER (OUTPATIENT)
Facility: HOSPITAL | Age: 33
Discharge: HOME | End: 2023-09-29
Payer: MEDICAID

## 2023-09-29 ENCOUNTER — HOSPITAL ENCOUNTER (OUTPATIENT)
Dept: GENERAL RADIOLOGY | Facility: HOSPITAL | Age: 33
Discharge: HOME | End: 2023-09-29
Payer: MEDICAID

## 2023-09-29 ENCOUNTER — OFFICE VISIT (OUTPATIENT)
Dept: PRIMARY CARE CLINIC | Age: 33
End: 2023-09-29
Payer: MEDICAID

## 2023-09-29 VITALS
OXYGEN SATURATION: 98 % | HEART RATE: 92 BPM | WEIGHT: 128 LBS | SYSTOLIC BLOOD PRESSURE: 126 MMHG | DIASTOLIC BLOOD PRESSURE: 82 MMHG | BODY MASS INDEX: 21.97 KG/M2

## 2023-09-29 DIAGNOSIS — M54.2 ACUTE NECK PAIN: ICD-10-CM

## 2023-09-29 DIAGNOSIS — F32.A ANXIETY AND DEPRESSION: ICD-10-CM

## 2023-09-29 DIAGNOSIS — O92.79 CLOGGED DUCT, POSTPARTUM: Primary | ICD-10-CM

## 2023-09-29 DIAGNOSIS — F41.9 ANXIETY AND DEPRESSION: ICD-10-CM

## 2023-09-29 PROCEDURE — G8420 CALC BMI NORM PARAMETERS: HCPCS | Performed by: PHYSICIAN ASSISTANT

## 2023-09-29 PROCEDURE — G8427 DOCREV CUR MEDS BY ELIG CLIN: HCPCS | Performed by: PHYSICIAN ASSISTANT

## 2023-09-29 PROCEDURE — 99214 OFFICE O/P EST MOD 30 MIN: CPT | Performed by: PHYSICIAN ASSISTANT

## 2023-09-29 PROCEDURE — 72040 X-RAY EXAM NECK SPINE 2-3 VW: CPT

## 2023-09-29 PROCEDURE — 1036F TOBACCO NON-USER: CPT | Performed by: PHYSICIAN ASSISTANT

## 2023-09-29 RX ORDER — CEPHALEXIN 500 MG/1
500 CAPSULE ORAL 3 TIMES DAILY
Qty: 21 CAPSULE | Refills: 0 | Status: SHIPPED | OUTPATIENT
Start: 2023-09-29 | End: 2023-10-06

## 2023-09-29 ASSESSMENT — ENCOUNTER SYMPTOMS
ABDOMINAL PAIN: 0
ROS SKIN COMMENTS: LEFT BREAST PAIN
DIARRHEA: 0
EYE PAIN: 0
SORE THROAT: 0
CONSTIPATION: 0
SHORTNESS OF BREATH: 0
COUGH: 0

## 2023-09-29 NOTE — PROGRESS NOTES
Chief Complaint   Patient presents with    Neck Pain     Pt gave birth 4 weeks ago and had a migraine for 5 days after. Pt reports after migraine went away she has had consistent neck pain that she has tried treating with ibuprofen. Mastitis     Pt thinks she may have mastitis in left left breast. Pt does strictly breat feed . Medication Adjustment     Pt also wants to discuss her Zoloft. Pt thinks she is struggling with PPD and has upped her 50mg Zoloft to now taking 75mg by taking 1 and a half tablets. Have you seen any other physician or provider since your last visit no    Have you had any other diagnostic tests since your last visit? no    Have you changed or stopped any medications since your last visit? no       Mary Andrew (:  1990) is a 26 y.o. female,Established patient, here for evaluation of the following chief complaint(s):  Neck Pain (Pt gave birth 4 weeks ago and had a migraine for 5 days after. Pt reports after migraine went away she has had consistent neck pain that she has tried treating with ibuprofen. ), Mastitis (Pt thinks she may have mastitis in left left breast. Pt does strictly breat feed . ), and Medication Adjustment (Pt also wants to discuss her Zoloft. Pt thinks she is struggling with PPD and has upped her 50mg Zoloft to now taking 75mg by taking 1 and a half tablets. )           Subjective   SUBJECTIVE/OBJECTIVE:  HPI  Mrs. Andrew is a pleasant 26year old female with PMH Anxiety /Depression  and FE Deficiency Anemia  who presents today with several concerns. She has a 1 week old son she is breastfeeding. She complains of painful area on left breast for 1 day. She denies any fever, chills, nausea, vomiting or diarrhea. She denies any redness or rash. She increased her dose of Zoloft up to 75 mg 3 weeks ago due to increased anxiety and crying more often. She denies any SI or HI.   She reports improvement in symptoms since increasing dose and reports

## 2024-01-07 DIAGNOSIS — F41.9 ANXIETY AND DEPRESSION: ICD-10-CM

## 2024-01-07 DIAGNOSIS — F32.A ANXIETY AND DEPRESSION: ICD-10-CM

## 2024-05-13 DIAGNOSIS — F32.A ANXIETY AND DEPRESSION: ICD-10-CM

## 2024-05-13 DIAGNOSIS — F41.9 ANXIETY AND DEPRESSION: ICD-10-CM

## 2024-06-10 DIAGNOSIS — F41.9 ANXIETY AND DEPRESSION: ICD-10-CM

## 2024-06-10 DIAGNOSIS — F32.A ANXIETY AND DEPRESSION: ICD-10-CM

## 2024-06-27 ENCOUNTER — OFFICE VISIT (OUTPATIENT)
Dept: PRIMARY CARE CLINIC | Age: 34
End: 2024-06-27
Payer: MEDICAID

## 2024-06-27 ENCOUNTER — HOSPITAL ENCOUNTER (OUTPATIENT)
Facility: HOSPITAL | Age: 34
Discharge: HOME | End: 2024-06-27
Payer: MEDICAID

## 2024-06-27 VITALS
SYSTOLIC BLOOD PRESSURE: 106 MMHG | TEMPERATURE: 97.1 F | HEART RATE: 84 BPM | DIASTOLIC BLOOD PRESSURE: 70 MMHG | OXYGEN SATURATION: 97 % | WEIGHT: 129 LBS | BODY MASS INDEX: 22.02 KG/M2 | HEIGHT: 64 IN

## 2024-06-27 DIAGNOSIS — Z82.79 FAMILY HISTORY OF BICUSPID AORTIC VALVE: ICD-10-CM

## 2024-06-27 DIAGNOSIS — F41.9 ANXIETY AND DEPRESSION: ICD-10-CM

## 2024-06-27 DIAGNOSIS — Z76.89 ESTABLISHING CARE WITH NEW DOCTOR, ENCOUNTER FOR: ICD-10-CM

## 2024-06-27 DIAGNOSIS — Z76.89 ESTABLISHING CARE WITH NEW DOCTOR, ENCOUNTER FOR: Primary | ICD-10-CM

## 2024-06-27 DIAGNOSIS — F32.A ANXIETY AND DEPRESSION: ICD-10-CM

## 2024-06-27 LAB
25(OH)D3 SERPL-MCNC: 38.7 NG/ML (ref 32–100)
ALBUMIN SERPL-MCNC: 4.7 G/DL (ref 3.4–4.8)
ALBUMIN/GLOB SERPL: 2 {RATIO} (ref 0.8–2)
ALP SERPL-CCNC: 111 U/L (ref 25–100)
ALT SERPL-CCNC: 14 U/L (ref 4–36)
ANION GAP SERPL CALCULATED.3IONS-SCNC: 10 MMOL/L (ref 3–16)
AST SERPL-CCNC: 17 U/L (ref 8–33)
BASOPHILS # BLD: 0.1 K/UL (ref 0–0.1)
BASOPHILS NFR BLD: 0.7 %
BILIRUB SERPL-MCNC: 0.6 MG/DL (ref 0.3–1.2)
BUN SERPL-MCNC: 15 MG/DL (ref 6–20)
CALCIUM SERPL-MCNC: 8.7 MG/DL (ref 8.5–10.5)
CHLORIDE SERPL-SCNC: 103 MMOL/L (ref 98–107)
CHOLEST SERPL-MCNC: 255 MG/DL (ref 0–200)
CO2 SERPL-SCNC: 26 MMOL/L (ref 20–30)
CREAT SERPL-MCNC: 0.8 MG/DL (ref 0.4–1.2)
EOSINOPHIL # BLD: 0.1 K/UL (ref 0–0.4)
EOSINOPHIL NFR BLD: 1.7 %
ERYTHROCYTE [DISTWIDTH] IN BLOOD BY AUTOMATED COUNT: 13.2 % (ref 11–16)
GFR SERPLBLD CREATININE-BSD FMLA CKD-EPI: >90 ML/MIN/{1.73_M2}
GLOBULIN SER CALC-MCNC: 2.3 G/DL
GLUCOSE SERPL-MCNC: 85 MG/DL (ref 74–106)
HBA1C MFR BLD: 5.3 %
HCT VFR BLD AUTO: 43.7 % (ref 37–47)
HDLC SERPL-MCNC: 77 MG/DL (ref 40–60)
HGB BLD-MCNC: 14.4 G/DL (ref 11.5–16.5)
IMM GRANULOCYTES # BLD: 0 K/UL
IMM GRANULOCYTES NFR BLD: 0.3 % (ref 0–5)
LDLC SERPL CALC-MCNC: 165 MG/DL
LYMPHOCYTES # BLD: 2.2 K/UL (ref 1.5–4)
LYMPHOCYTES NFR BLD: 30.5 %
MCH RBC QN AUTO: 30 PG (ref 27–32)
MCHC RBC AUTO-ENTMCNC: 33 G/DL (ref 31–35)
MCV RBC AUTO: 91 FL (ref 80–100)
MONOCYTES # BLD: 0.6 K/UL (ref 0.2–0.8)
MONOCYTES NFR BLD: 8.6 %
NEUTROPHILS # BLD: 4.2 K/UL (ref 2–7.5)
NEUTS SEG NFR BLD: 58.2 %
PLATELET # BLD AUTO: 192 K/UL (ref 150–400)
PMV BLD AUTO: 11.1 FL (ref 6–10)
POTASSIUM SERPL-SCNC: 4.5 MMOL/L (ref 3.4–5.1)
PROT SERPL-MCNC: 7 G/DL (ref 6.4–8.3)
RBC # BLD AUTO: 4.8 M/UL (ref 3.8–5.8)
SODIUM SERPL-SCNC: 139 MMOL/L (ref 136–145)
TRIGL SERPL-MCNC: 63 MG/DL (ref 0–249)
TSH SERPL-MCNC: 1.86 UIU/ML (ref 0.27–4.2)
VLDLC SERPL CALC-MCNC: 13 MG/DL
WBC # BLD AUTO: 7.2 K/UL (ref 4–11)

## 2024-06-27 PROCEDURE — 84443 ASSAY THYROID STIM HORMONE: CPT

## 2024-06-27 PROCEDURE — 85025 COMPLETE CBC W/AUTO DIFF WBC: CPT

## 2024-06-27 PROCEDURE — 1036F TOBACCO NON-USER: CPT | Performed by: PHYSICIAN ASSISTANT

## 2024-06-27 PROCEDURE — G8420 CALC BMI NORM PARAMETERS: HCPCS | Performed by: PHYSICIAN ASSISTANT

## 2024-06-27 PROCEDURE — 83036 HEMOGLOBIN GLYCOSYLATED A1C: CPT

## 2024-06-27 PROCEDURE — 80053 COMPREHEN METABOLIC PANEL: CPT

## 2024-06-27 PROCEDURE — 99213 OFFICE O/P EST LOW 20 MIN: CPT | Performed by: PHYSICIAN ASSISTANT

## 2024-06-27 PROCEDURE — G8427 DOCREV CUR MEDS BY ELIG CLIN: HCPCS | Performed by: PHYSICIAN ASSISTANT

## 2024-06-27 PROCEDURE — 80061 LIPID PANEL: CPT

## 2024-06-27 PROCEDURE — 82306 VITAMIN D 25 HYDROXY: CPT

## 2024-06-27 SDOH — ECONOMIC STABILITY: FOOD INSECURITY: WITHIN THE PAST 12 MONTHS, THE FOOD YOU BOUGHT JUST DIDN'T LAST AND YOU DIDN'T HAVE MONEY TO GET MORE.: NEVER TRUE

## 2024-06-27 SDOH — ECONOMIC STABILITY: INCOME INSECURITY: HOW HARD IS IT FOR YOU TO PAY FOR THE VERY BASICS LIKE FOOD, HOUSING, MEDICAL CARE, AND HEATING?: NOT HARD AT ALL

## 2024-06-27 SDOH — ECONOMIC STABILITY: FOOD INSECURITY: WITHIN THE PAST 12 MONTHS, YOU WORRIED THAT YOUR FOOD WOULD RUN OUT BEFORE YOU GOT MONEY TO BUY MORE.: NEVER TRUE

## 2024-06-27 ASSESSMENT — PATIENT HEALTH QUESTIONNAIRE - PHQ9
2. FEELING DOWN, DEPRESSED OR HOPELESS: NOT AT ALL
1. LITTLE INTEREST OR PLEASURE IN DOING THINGS: NOT AT ALL
SUM OF ALL RESPONSES TO PHQ QUESTIONS 1-9: 0
SUM OF ALL RESPONSES TO PHQ QUESTIONS 1-9: 0
5. POOR APPETITE OR OVEREATING: NOT AT ALL
8. MOVING OR SPEAKING SO SLOWLY THAT OTHER PEOPLE COULD HAVE NOTICED. OR THE OPPOSITE, BEING SO FIGETY OR RESTLESS THAT YOU HAVE BEEN MOVING AROUND A LOT MORE THAN USUAL: NOT AT ALL
SUM OF ALL RESPONSES TO PHQ9 QUESTIONS 1 & 2: 0
3. TROUBLE FALLING OR STAYING ASLEEP: NOT AT ALL
SUM OF ALL RESPONSES TO PHQ QUESTIONS 1-9: 0
10. IF YOU CHECKED OFF ANY PROBLEMS, HOW DIFFICULT HAVE THESE PROBLEMS MADE IT FOR YOU TO DO YOUR WORK, TAKE CARE OF THINGS AT HOME, OR GET ALONG WITH OTHER PEOPLE: NOT DIFFICULT AT ALL
9. THOUGHTS THAT YOU WOULD BE BETTER OFF DEAD, OR OF HURTING YOURSELF: NOT AT ALL
4. FEELING TIRED OR HAVING LITTLE ENERGY: NOT AT ALL
SUM OF ALL RESPONSES TO PHQ QUESTIONS 1-9: 0
7. TROUBLE CONCENTRATING ON THINGS, SUCH AS READING THE NEWSPAPER OR WATCHING TELEVISION: NOT AT ALL
6. FEELING BAD ABOUT YOURSELF - OR THAT YOU ARE A FAILURE OR HAVE LET YOURSELF OR YOUR FAMILY DOWN: NOT AT ALL

## 2024-06-27 ASSESSMENT — ENCOUNTER SYMPTOMS
RESPIRATORY NEGATIVE: 1
GASTROINTESTINAL NEGATIVE: 1

## 2024-06-27 NOTE — PROGRESS NOTES
Chief Complaint   Patient presents with    Establish Care    Depression     Patient would like to get a refill on her zoloft.        Have you seen any other physician or provider since your last visit no    Have you had any other diagnostic tests since your last visit? no    Have you changed or stopped any medications since your last visit? no             
    Standing Status:   Future     Standing Expiration Date:   7/27/2024    Hemoglobin A1C     Standing Status:   Future     Standing Expiration Date:   7/27/2024    Lipid Panel     Standing Status:   Future     Standing Expiration Date:   7/27/2024     Order Specific Question:   Is Patient Fasting?/# of Hours     Answer:   12     Order Specific Question:   Has the patient fasted?     Answer:   Yes    TSH with Reflex     Standing Status:   Future     Standing Expiration Date:   7/27/2024    Vitamin D 25 Hydroxy     Standing Status:   Future     Standing Expiration Date:   7/27/2024           Electronically signed by CHERI Sanford on 6/27/2024

## 2024-07-30 DIAGNOSIS — F41.9 ANXIETY AND DEPRESSION: ICD-10-CM

## 2024-07-30 DIAGNOSIS — F32.A ANXIETY AND DEPRESSION: ICD-10-CM

## (undated) DEVICE — FLEX ADVANTAGE 1500CC: Brand: FLEX ADVANTAGE

## (undated) DEVICE — HNDL SWVL PRECONN W/TBG 6FT 1/2IN

## (undated) DEVICE — PAD SANI MAXI W/ADHS SNG WRP 11IN

## (undated) DEVICE — SPECIMEN SOCK - LARGE PORT: Brand: MEDI-VAC

## (undated) DEVICE — RICH MINOR LITHOTOMY: Brand: MEDLINE INDUSTRIES, INC.

## (undated) DEVICE — CUFF SCD HEMOFORCE SEQ CALF STD MD

## (undated) DEVICE — SUT GUT CHRM 2/0 SH 27IN G123H

## (undated) DEVICE — GLV SURG BIOGEL M LTX PF 8

## (undated) DEVICE — GOWN,SIRUS,NON REINFRCD XL XLONG: Brand: MEDLINE